# Patient Record
Sex: MALE | Race: BLACK OR AFRICAN AMERICAN | Employment: FULL TIME | URBAN - METROPOLITAN AREA
[De-identification: names, ages, dates, MRNs, and addresses within clinical notes are randomized per-mention and may not be internally consistent; named-entity substitution may affect disease eponyms.]

---

## 2017-04-26 ENCOUNTER — APPOINTMENT (OUTPATIENT)
Dept: CT IMAGING | Age: 52
End: 2017-04-26
Attending: PHYSICIAN ASSISTANT
Payer: SELF-PAY

## 2017-04-26 ENCOUNTER — HOSPITAL ENCOUNTER (EMERGENCY)
Age: 52
Discharge: HOME | End: 2017-04-26
Attending: EMERGENCY MEDICINE
Payer: SELF-PAY

## 2017-04-26 VITALS
HEIGHT: 68 IN | TEMPERATURE: 98.7 F | OXYGEN SATURATION: 96 % | SYSTOLIC BLOOD PRESSURE: 150 MMHG | BODY MASS INDEX: 30.01 KG/M2 | HEART RATE: 68 BPM | DIASTOLIC BLOOD PRESSURE: 98 MMHG | WEIGHT: 198 LBS | RESPIRATION RATE: 20 BRPM

## 2017-04-26 DIAGNOSIS — R10.32 ABDOMINAL PAIN, LLQ (LEFT LOWER QUADRANT): ICD-10-CM

## 2017-04-26 DIAGNOSIS — N30.00 ACUTE CYSTITIS WITHOUT HEMATURIA: ICD-10-CM

## 2017-04-26 DIAGNOSIS — K57.21 DIVERTICULITIS OF LARGE INTESTINE WITH PERFORATION WITH BLEEDING: Primary | ICD-10-CM

## 2017-04-26 LAB
ALBUMIN SERPL BCP-MCNC: 4 G/DL (ref 3.4–5)
ALBUMIN/GLOB SERPL: 1.1 {RATIO} (ref 0.8–1.7)
ALP SERPL-CCNC: 59 U/L (ref 45–117)
ALT SERPL-CCNC: 31 U/L (ref 16–61)
ANION GAP BLD CALC-SCNC: 7 MMOL/L (ref 3–18)
APPEARANCE UR: CLEAR
AST SERPL W P-5'-P-CCNC: 13 U/L (ref 15–37)
BACTERIA URNS QL MICRO: ABNORMAL /HPF
BASOPHILS # BLD AUTO: 0 K/UL (ref 0–0.06)
BASOPHILS # BLD: 0 % (ref 0–2)
BILIRUB DIRECT SERPL-MCNC: 0.1 MG/DL (ref 0–0.2)
BILIRUB SERPL-MCNC: 0.6 MG/DL (ref 0.2–1)
BILIRUB UR QL: NEGATIVE
BUN SERPL-MCNC: 15 MG/DL (ref 7–18)
BUN/CREAT SERPL: 16 (ref 12–20)
CALCIUM SERPL-MCNC: 9.3 MG/DL (ref 8.5–10.1)
CHLORIDE SERPL-SCNC: 105 MMOL/L (ref 100–108)
CO2 SERPL-SCNC: 27 MMOL/L (ref 21–32)
COLOR UR: YELLOW
CREAT SERPL-MCNC: 0.95 MG/DL (ref 0.6–1.3)
DIFFERENTIAL METHOD BLD: ABNORMAL
EOSINOPHIL # BLD: 0.1 K/UL (ref 0–0.4)
EOSINOPHIL NFR BLD: 1 % (ref 0–5)
EPITH CASTS URNS QL MICRO: ABNORMAL /LPF (ref 0–5)
ERYTHROCYTE [DISTWIDTH] IN BLOOD BY AUTOMATED COUNT: 14.1 % (ref 11.6–14.5)
GLOBULIN SER CALC-MCNC: 3.8 G/DL (ref 2–4)
GLUCOSE SERPL-MCNC: 94 MG/DL (ref 74–99)
GLUCOSE UR STRIP.AUTO-MCNC: NEGATIVE MG/DL
HCT VFR BLD AUTO: 43.7 % (ref 36–48)
HGB BLD-MCNC: 14.7 G/DL (ref 13–16)
HGB UR QL STRIP: NEGATIVE
KETONES UR QL STRIP.AUTO: ABNORMAL MG/DL
LEUKOCYTE ESTERASE UR QL STRIP.AUTO: ABNORMAL
LIPASE SERPL-CCNC: 237 U/L (ref 73–393)
LYMPHOCYTES # BLD AUTO: 16 % (ref 21–52)
LYMPHOCYTES # BLD: 1.5 K/UL (ref 0.9–3.6)
MCH RBC QN AUTO: 27.9 PG (ref 24–34)
MCHC RBC AUTO-ENTMCNC: 33.6 G/DL (ref 31–37)
MCV RBC AUTO: 82.9 FL (ref 74–97)
MONOCYTES # BLD: 0.5 K/UL (ref 0.05–1.2)
MONOCYTES NFR BLD AUTO: 5 % (ref 3–10)
MUCOUS THREADS URNS QL MICRO: ABNORMAL /LPF
NEUTS SEG # BLD: 7.4 K/UL (ref 1.8–8)
NEUTS SEG NFR BLD AUTO: 78 % (ref 40–73)
NITRITE UR QL STRIP.AUTO: NEGATIVE
PH UR STRIP: 6.5 [PH] (ref 5–8)
PLATELET # BLD AUTO: 276 K/UL (ref 135–420)
PMV BLD AUTO: 9.2 FL (ref 9.2–11.8)
POTASSIUM SERPL-SCNC: 3.7 MMOL/L (ref 3.5–5.5)
PROT SERPL-MCNC: 7.8 G/DL (ref 6.4–8.2)
PROT UR STRIP-MCNC: NEGATIVE MG/DL
RBC # BLD AUTO: 5.27 M/UL (ref 4.7–5.5)
RBC #/AREA URNS HPF: 0 /HPF (ref 0–5)
SODIUM SERPL-SCNC: 139 MMOL/L (ref 136–145)
SP GR UR REFRACTOMETRY: >1.03 (ref 1–1.03)
UROBILINOGEN UR QL STRIP.AUTO: 1 EU/DL (ref 0.2–1)
WBC # BLD AUTO: 9.5 K/UL (ref 4.6–13.2)
WBC URNS QL MICRO: ABNORMAL /HPF (ref 0–4)

## 2017-04-26 PROCEDURE — 99285 EMERGENCY DEPT VISIT HI MDM: CPT

## 2017-04-26 PROCEDURE — 99284 EMERGENCY DEPT VISIT MOD MDM: CPT

## 2017-04-26 PROCEDURE — 96374 THER/PROPH/DIAG INJ IV PUSH: CPT

## 2017-04-26 PROCEDURE — 74011636320 HC RX REV CODE- 636/320: Performed by: EMERGENCY MEDICINE

## 2017-04-26 PROCEDURE — 80048 BASIC METABOLIC PNL TOTAL CA: CPT | Performed by: PHYSICIAN ASSISTANT

## 2017-04-26 PROCEDURE — 74011250636 HC RX REV CODE- 250/636: Performed by: PHYSICIAN ASSISTANT

## 2017-04-26 PROCEDURE — 80076 HEPATIC FUNCTION PANEL: CPT | Performed by: PHYSICIAN ASSISTANT

## 2017-04-26 PROCEDURE — 81001 URINALYSIS AUTO W/SCOPE: CPT | Performed by: PHYSICIAN ASSISTANT

## 2017-04-26 PROCEDURE — 74177 CT ABD & PELVIS W/CONTRAST: CPT

## 2017-04-26 PROCEDURE — 85025 COMPLETE CBC W/AUTO DIFF WBC: CPT | Performed by: PHYSICIAN ASSISTANT

## 2017-04-26 PROCEDURE — 83690 ASSAY OF LIPASE: CPT | Performed by: PHYSICIAN ASSISTANT

## 2017-04-26 RX ORDER — KETOROLAC TROMETHAMINE 30 MG/ML
30 INJECTION, SOLUTION INTRAMUSCULAR; INTRAVENOUS
Status: COMPLETED | OUTPATIENT
Start: 2017-04-26 | End: 2017-04-26

## 2017-04-26 RX ORDER — CIPROFLOXACIN 500 MG/1
500 TABLET ORAL 2 TIMES DAILY
Qty: 20 TAB | Refills: 0 | Status: SHIPPED | OUTPATIENT
Start: 2017-04-26 | End: 2017-04-26

## 2017-04-26 RX ORDER — METRONIDAZOLE 500 MG/1
500 TABLET ORAL 2 TIMES DAILY
Qty: 10 TAB | Refills: 0 | Status: SHIPPED | OUTPATIENT
Start: 2017-04-26 | End: 2017-04-26

## 2017-04-26 RX ORDER — METRONIDAZOLE 500 MG/1
500 TABLET ORAL 2 TIMES DAILY
Qty: 10 TAB | Refills: 0 | Status: SHIPPED | OUTPATIENT
Start: 2017-04-26 | End: 2017-05-03

## 2017-04-26 RX ORDER — CIPROFLOXACIN 500 MG/1
500 TABLET ORAL 2 TIMES DAILY
Qty: 20 TAB | Refills: 0 | Status: SHIPPED | OUTPATIENT
Start: 2017-04-26 | End: 2017-05-06

## 2017-04-26 RX ORDER — HYDROCODONE BITARTRATE AND ACETAMINOPHEN 5; 325 MG/1; MG/1
1 TABLET ORAL
Qty: 20 TAB | Refills: 0 | Status: SHIPPED | OUTPATIENT
Start: 2017-04-26 | End: 2017-12-27

## 2017-04-26 RX ADMIN — IOPAMIDOL 100 ML: 612 INJECTION, SOLUTION INTRAVENOUS at 20:18

## 2017-04-26 RX ADMIN — KETOROLAC TROMETHAMINE 30 MG: 30 INJECTION, SOLUTION INTRAMUSCULAR at 18:41

## 2017-04-26 NOTE — ED PROVIDER NOTES
HPI Comments: 54yo M with h/o HTN and hypercholesterolemia c/o abdominal pain since last night at midnight. Pain is LLQ. Does not radiate. Worse with movement. Not alleviated by pepto or ginger ale. Pain has been constant all night. Rated 7/10. Confirms one episode of diarrhea today, no blood in stool. No nausea or vomiting. Confirms dysuria and frequency. No other complaints. No history of abdominal surgeries or diverticulitis. History of heartburn, takes zantac, none recently. Patient is a 46 y.o. male presenting with abdominal pain. Abdominal Pain    Associated symptoms include diarrhea, dysuria and frequency. Pertinent negatives include no fever, no nausea, no vomiting, no constipation and no chest pain. Past Medical History:   Diagnosis Date    Advance directive discussed with patient 10/6/2016    High cholesterol     Hypertension     Other ill-defined conditions     cardiac monitoring- history of syncope    Pure hypercholesterolemia 12/1/2011    Syncope        Past Surgical History:   Procedure Laterality Date    HX OTHER SURGICAL      cardiac monitoring device implant         Family History:   Problem Relation Age of Onset    Hypertension Mother     Heart Disease Mother     Cancer Mother     Cancer Father        Social History     Social History    Marital status:      Spouse name: N/A    Number of children: N/A    Years of education: N/A     Occupational History    Not on file. Social History Main Topics    Smoking status: Never Smoker    Smokeless tobacco: Never Used    Alcohol use No    Drug use: No    Sexual activity: Yes     Other Topics Concern    Not on file     Social History Narrative         ALLERGIES: Review of patient's allergies indicates no known allergies. Review of Systems   Constitutional: Negative for fever. HENT: Negative for facial swelling. Eyes: Negative for visual disturbance.    Respiratory: Negative for shortness of breath. Cardiovascular: Negative for chest pain. Gastrointestinal: Positive for abdominal pain and diarrhea. Negative for blood in stool, constipation, nausea and vomiting. Genitourinary: Positive for dysuria and frequency. Musculoskeletal: Negative for neck pain. Skin: Negative for rash. Neurological: Negative for dizziness. Psychiatric/Behavioral: Negative for confusion. All other systems reviewed and are negative. Vitals:    04/26/17 1812 04/26/17 1821 04/26/17 1823   BP: (!) 154/109 (!) 165/112    Pulse: 73  72   Resp: 18  18   Temp: 99.1 °F (37.3 °C)     SpO2: 100%  95%   Weight: 89.8 kg (198 lb)     Height: 5' 8\" (1.727 m)              Physical Exam   Constitutional: He appears well-developed and well-nourished. No distress. HENT:   Head: Normocephalic and atraumatic. Eyes: Conjunctivae are normal.   Neck: Normal range of motion. Neck supple. Cardiovascular: Normal rate. Pulmonary/Chest: Effort normal.   Abdominal: Soft. Bowel sounds are normal. He exhibits no distension. There is tenderness in the left upper quadrant and left lower quadrant. There is guarding. Musculoskeletal: Normal range of motion. Neurological: He is alert. Skin: Skin is warm and dry. He is not diaphoretic. Psychiatric: He has a normal mood and affect. Nursing note and vitals reviewed. MDM  Number of Diagnoses or Management Options  Abdominal pain, LLQ (left lower quadrant): new and requires workup  Acute cystitis without hematuria: new and requires workup  Diverticulitis of large intestine with perforation with bleeding [facility]): new and requires workup  Diagnosis management comments: C/o abd pain LLQ a/w diarrhea. TTP LLQ and LUQ. Afebrile. BP elevated. The patient presents with abdominal pain with a differential diagnosis of diverticulitis, gastritis, gastroenteritis, GERD, obstruction, pancreatitis, PUD, renal Colic and UTI.       2048: CT results + acute diverticulitis with extraluminal air. No abscess, can be managed outpatient. Started on abx. Has appt with PCP next week. Also has small renal cyst that will need f/u. Pain is currently controlled. Also has UTI. Discussed plan with Dr. Johnna Henderson who is in agreement. Discussed treatment plan, return precautions, symptomatic relief, and expected time to improvement. All questions answered. Patient is stable for discharge and outpatient management. Amount and/or Complexity of Data Reviewed  Clinical lab tests: ordered and reviewed  Tests in the radiology section of CPT®: ordered and reviewed    Risk of Complications, Morbidity, and/or Mortality  Presenting problems: moderate  Diagnostic procedures: moderate  Management options: moderate      ED Course       Procedures    Diagnosis:   1. Diverticulitis of large intestine with perforation with bleeding (HCC)    2. Abdominal pain, LLQ (left lower quadrant)    3. Acute cystitis without hematuria          Disposition: home    Follow-up Information     Follow up With Details Comments Catherine Kim., DO Schedule an appointment as soon as possible for a visit in 1 week  [address] [address]  [phone]      [facility] EMERGENCY DEPT  Immediately if symptoms worsen [address]  [phone]          Patient's Medications   Start Taking    CIPROFLOXACIN HCL (CIPRO) 500 MG TABLET    Take 1 Tab by mouth two (2) times a day for 10 days. HYDROCODONE-ACETAMINOPHEN (NORCO) 5-325 MG PER TABLET    Take 1 Tab by mouth every four (4) hours as needed for Pain. Max Daily Amount: 6 Tabs. METRONIDAZOLE (FLAGYL) 500 MG TABLET    Take 1 Tab by mouth two (2) times a day for 7 days. Continue Taking    AMLODIPINE (NORVASC) 10 MG TABLET    Take 1 Tab by mouth daily. Take 1 Tab by Mouth Once a Day. ASPIRIN 81 MG CHEWABLE TABLET    Take 1 Tab by mouth daily. Take 81 mg by Mouth Once a Day. ATORVASTATIN (LIPITOR) 40 MG TABLET    Take 1 Tab by mouth daily. Take 1 Tab by Mouth Every Night at Bedtime. LISINOPRIL (PRINIVIL, ZESTRIL) 20 MG TABLET    Take 1 Tab by mouth daily. NAPROXEN (NAPROSYN) 375 MG TABLET    Take 1 Tab by mouth two (2) times daily (with meals). These Medications have changed    No medications on file   Stop Taking    HYDROCODONE-ACETAMINOPHEN (NORCO) 5-325 MG PER TABLET    Take 1 Tab by mouth every eight (8) hours as needed for Pain.      Eliazar Calderon PA-C

## 2017-04-27 NOTE — DISCHARGE INSTRUCTIONS
Take suggested medications for control of abdominal pain. Increase fluid intake (water). Return to ER immediately if you develop new or worsening symptoms including vomiting, high fevers, worsening pain. Urinary Tract Infections in Men: Care Instructions  Your Care Instructions    A urinary tract infection, or UTI, is a general term for an infection anywhere between the kidneys and the tip of the penis. UTIs can also be a result of a prostate problem. Most cause pain or burning when you urinate. Most UTIs are caused by bacteria and can be cured with antibiotics. It is important to complete your treatment so that the infection does not get worse. Follow-up care is a key part of your treatment and safety. Be sure to make and go to all appointments, and call your doctor if you are having problems. It's also a good idea to know your test results and keep a list of the medicines you take. How can you care for yourself at home? · Take your antibiotics as prescribed. Do not stop taking them just because you feel better. You need to take the full course of antibiotics. · Take your medicines exactly as prescribed. Your doctor may have prescribed a medicine, such as phenazopyridine (Pyridium), to help relieve pain when you urinate. This turns your urine orange. You may stop taking it when your symptoms get better. But be sure to take all of your antibiotics, which treat the infection. · Drink extra water for the next day or two. This will help make the urine less concentrated and help wash out the bacteria causing the infection. (If you have kidney, heart, or liver disease and have to limit your fluids, talk with your doctor before you increase your fluid intake.)  · Avoid drinks that are carbonated or have caffeine. They can irritate the bladder. · Urinate often. Try to empty your bladder each time. · To relieve pain, take a hot bath or lay a heating pad (set on low) over your lower belly or genital area. Never go to sleep with a heating pad in place. To help prevent UTIs  · Drink plenty of fluids, enough so that your urine is light yellow or clear like water. If you have kidney, heart, or liver disease and have to limit fluids, talk with your doctor before you increase the amount of fluids you drink. · Urinate when you have the urge. Do not hold your urine for a long time. Urinate before you go to sleep. · Keep your penis clean. Catheter care  If you have a drainage tube (catheter) in place, the following steps will help you care for it. · Always wash your hands before and after touching your catheter. · Check the area around the urethra for inflammation or signs of infection. Signs of infection include irritated, swollen, red, or tender skin, or pus around the catheter. · Clean the area around the catheter with soap and water two times a day. Dry with a clean towel afterward. · Do not apply powder or lotion to the skin around the catheter. To empty the urine collection bag  · Wash your hands with soap and water. · Without touching the drain spout, remove the spout from its sleeve at the bottom of the collection bag. Open the valve on the spout. · Let the urine flow out of the bag and into the toilet or a container. Do not let the tubing or drain spout touch anything. · After you empty the bag, clean the end of the drain spout with tissue and water. Close the valve and put the drain spout back into its sleeve at the bottom of the collection bag. · Wash your hands with soap and water. When should you call for help? Call your doctor now or seek immediate medical care if:  · Symptoms such as a fever, chills, nausea, or vomiting get worse or happen for the first time. · You have new pain in your back just below your rib cage. This is called flank pain. · There is new blood or pus in your urine. · You are not able to take or keep down your antibiotics.   Watch closely for changes in your health, and be sure to contact your doctor if:  · You are not getting better after taking an antibiotic for 2 days. · Your symptoms go away but then come back. Where can you learn more? Go to http://ad-jeremías.info/. Enter B712 in the search box to learn more about \"Urinary Tract Infections in Men: Care Instructions. \"  Current as of: November 28, 2016  Content Version: 11.2  © 8878-3723 [employer]. Care instructions adapted under license by [employer] (which disclaims liability or warranty for this information). If you have questions about a medical condition or this instruction, always ask your healthcare professional. [address] [address] any warranty or liability for your use of this information. Diverticulitis: Care Instructions  Your Care Instructions    Diverticulitis occurs when pouches form in the wall of the colon and become inflamed or infected. It can be very painful. Doctors aren't sure what causes diverticulitis. There is no proof that foods such as nuts, seeds, or berries cause it or make it worse. A low-fiber diet may cause the colon to work harder to push stool forward. Pouches may form because of this extra work. It may be hard to think about healthy eating while you're in pain. But as you recover, you might think about how you can use healthy eating for overall better health. Healthy eating may help you avoid future attacks. Follow-up care is a key part of your treatment and safety. Be sure to make and go to all appointments, and call your doctor if you are having problems. It's also a good idea to know your test results and keep a list of the medicines you take. How can you care for yourself at home? · Drink plenty of fluids, enough so that your urine is light yellow or clear like water.  If you have kidney, heart, or liver disease and have to limit fluids, talk with your doctor before you increase the amount of fluids you drink.  · Stick to liquids or a bland diet (plain rice, bananas, dry toast or crackers, applesauce) until you are feeling better. Then you can return to regular foods and gradually increase the amount of fiber in your diet. · Use a heating pad set on low on your belly to relieve mild cramps and pain. · Get extra rest until you are feeling better. · Be safe with medicines. Read and follow all instructions on the label. ¨ If the doctor gave you a prescription medicine for pain, take it as prescribed. ¨ If you are not taking a prescription pain medicine, ask your doctor if you can take an over-the-counter medicine. · If your doctor prescribed antibiotics, take them as directed. Do not stop taking them just because you feel better. You need to take the full course of antibiotics. To prevent future attacks of diverticulitis  · Avoid constipation:  ¨ Include fruits, vegetables, beans, and whole grains in your diet each day. These foods are high in fiber. ¨ Drink plenty of fluids, enough so that your urine is light yellow or clear like water. If you have kidney, heart, or liver disease and have to limit fluids, talk with your doctor before you increase the amount of fluids you drink. ¨ Get some exercise every day. Build up slowly to 30 to 60 minutes a day on 5 or more days of the week. ¨ Take a fiber supplement, such as Citrucel or Metamucil, every day if needed. Read and follow all instructions on the label. ¨ Schedule time each day for a bowel movement. Having a daily routine may help. Take your time and do not strain when having a bowel movement. When should you call for help? Call 911 anytime you think you may need emergency care. For example, call if:  · You passed out (lost consciousness). · You vomit blood or what looks like coffee grounds. · You pass maroon or very bloody stools. Call your doctor now or seek immediate medical care if:  · You have severe pain or swelling in your belly.   · You have a new or higher fever. · You cannot keep down fluids or medicines. · You have new pain that gets worse when you move or cough. Watch closely for changes in your health, and be sure to contact your doctor if:  · The symptoms you had when you first started feeling sick come back. · You do not get better as expected. Where can you learn more? Go to http://ad-jeremías.info/. Enter H901 in the search box to learn more about \"Diverticulitis: Care Instructions. \"  Current as of: August 9, 2016  Content Version: 11.2  © 7095-8684 [employer]. Care instructions adapted under license by [employer] (which disclaims liability or warranty for this information). If you have questions about a medical condition or this instruction, always ask your healthcare professional. [address] any warranty or liability for your use of this information.

## 2017-04-27 NOTE — ED NOTES
Received call from pharmacy regarding flagyl rx. BID x 7 days with order to dispense only 10. Asked pharmacist to change rx to TID x 7 days as work-up revealed diverticulitis and dosing of flagyl is recommended TID.

## 2017-12-21 ENCOUNTER — DOCUMENTATION ONLY (OUTPATIENT)
Dept: FAMILY MEDICINE CLINIC | Age: 52
End: 2017-12-21

## 2017-12-27 ENCOUNTER — APPOINTMENT (OUTPATIENT)
Dept: CT IMAGING | Age: 52
End: 2017-12-27
Attending: EMERGENCY MEDICINE
Payer: SELF-PAY

## 2017-12-27 ENCOUNTER — HOSPITAL ENCOUNTER (EMERGENCY)
Age: 52
Discharge: HOME | End: 2017-12-27
Attending: EMERGENCY MEDICINE
Payer: SELF-PAY

## 2017-12-27 ENCOUNTER — APPOINTMENT (OUTPATIENT)
Dept: GENERAL RADIOLOGY | Age: 52
End: 2017-12-27
Attending: EMERGENCY MEDICINE
Payer: SELF-PAY

## 2017-12-27 VITALS
DIASTOLIC BLOOD PRESSURE: 89 MMHG | BODY MASS INDEX: 29.86 KG/M2 | RESPIRATION RATE: 22 BRPM | WEIGHT: 197 LBS | SYSTOLIC BLOOD PRESSURE: 146 MMHG | HEART RATE: 74 BPM | HEIGHT: 68 IN | OXYGEN SATURATION: 95 % | TEMPERATURE: 98.9 F

## 2017-12-27 DIAGNOSIS — I10 HYPERTENSION, ACCELERATED: ICD-10-CM

## 2017-12-27 DIAGNOSIS — R50.9 FEVER IN ADULT: Primary | ICD-10-CM

## 2017-12-27 DIAGNOSIS — R05.9 COUGH: ICD-10-CM

## 2017-12-27 DIAGNOSIS — I10 ESSENTIAL HYPERTENSION, MALIGNANT: ICD-10-CM

## 2017-12-27 DIAGNOSIS — E78.00 PURE HYPERCHOLESTEROLEMIA: ICD-10-CM

## 2017-12-27 DIAGNOSIS — R52 BODY ACHES: ICD-10-CM

## 2017-12-27 LAB
ANION GAP SERPL CALC-SCNC: 10 MMOL/L (ref 3–18)
BASOPHILS # BLD: 0 K/UL (ref 0–0.06)
BASOPHILS NFR BLD: 1 % (ref 0–2)
BUN SERPL-MCNC: 14 MG/DL (ref 7–18)
BUN/CREAT SERPL: 13 (ref 12–20)
CALCIUM SERPL-MCNC: 8.6 MG/DL (ref 8.5–10.1)
CHLORIDE SERPL-SCNC: 105 MMOL/L (ref 100–108)
CO2 SERPL-SCNC: 25 MMOL/L (ref 21–32)
CREAT SERPL-MCNC: 1.1 MG/DL (ref 0.6–1.3)
DIFFERENTIAL METHOD BLD: ABNORMAL
EOSINOPHIL # BLD: 0 K/UL (ref 0–0.4)
EOSINOPHIL NFR BLD: 1 % (ref 0–5)
ERYTHROCYTE [DISTWIDTH] IN BLOOD BY AUTOMATED COUNT: 14.3 % (ref 11.6–14.5)
FLUAV AG NPH QL IA: NEGATIVE
FLUBV AG NOSE QL IA: NEGATIVE
GLUCOSE SERPL-MCNC: 98 MG/DL (ref 74–99)
HCT VFR BLD AUTO: 39.8 % (ref 36–48)
HGB BLD-MCNC: 13.3 G/DL (ref 13–16)
LYMPHOCYTES # BLD: 0.5 K/UL (ref 0.9–3.6)
LYMPHOCYTES NFR BLD: 10 % (ref 21–52)
MCH RBC QN AUTO: 27.5 PG (ref 24–34)
MCHC RBC AUTO-ENTMCNC: 33.4 G/DL (ref 31–37)
MCV RBC AUTO: 82.2 FL (ref 74–97)
MONOCYTES # BLD: 0.5 K/UL (ref 0.05–1.2)
MONOCYTES NFR BLD: 11 % (ref 3–10)
NEUTS SEG # BLD: 3.9 K/UL (ref 1.8–8)
NEUTS SEG NFR BLD: 77 % (ref 40–73)
PLATELET # BLD AUTO: 274 K/UL (ref 135–420)
PMV BLD AUTO: 9.8 FL (ref 9.2–11.8)
POTASSIUM SERPL-SCNC: 3.4 MMOL/L (ref 3.5–5.5)
RBC # BLD AUTO: 4.84 M/UL (ref 4.7–5.5)
SODIUM SERPL-SCNC: 140 MMOL/L (ref 136–145)
TROPONIN I BLD-MCNC: <0.04 NG/ML (ref 0–0.08)
WBC # BLD AUTO: 5.1 K/UL (ref 4.6–13.2)

## 2017-12-27 PROCEDURE — 93005 ELECTROCARDIOGRAM TRACING: CPT

## 2017-12-27 PROCEDURE — 99285 EMERGENCY DEPT VISIT HI MDM: CPT

## 2017-12-27 PROCEDURE — 87804 INFLUENZA ASSAY W/OPTIC: CPT | Performed by: EMERGENCY MEDICINE

## 2017-12-27 PROCEDURE — 74011250637 HC RX REV CODE- 250/637: Performed by: EMERGENCY MEDICINE

## 2017-12-27 PROCEDURE — 85025 COMPLETE CBC W/AUTO DIFF WBC: CPT | Performed by: EMERGENCY MEDICINE

## 2017-12-27 PROCEDURE — 84484 ASSAY OF TROPONIN QUANT: CPT

## 2017-12-27 PROCEDURE — 70450 CT HEAD/BRAIN W/O DYE: CPT

## 2017-12-27 PROCEDURE — 71020 XR CHEST PA LAT: CPT

## 2017-12-27 PROCEDURE — 80048 BASIC METABOLIC PNL TOTAL CA: CPT | Performed by: EMERGENCY MEDICINE

## 2017-12-27 RX ORDER — AMLODIPINE BESYLATE 10 MG/1
10 TABLET ORAL DAILY
Qty: 90 TAB | Refills: 4 | Status: SHIPPED | OUTPATIENT
Start: 2017-12-27 | End: 2019-02-11 | Stop reason: SDUPTHER

## 2017-12-27 RX ORDER — AMLODIPINE BESYLATE 5 MG/1
10 TABLET ORAL
Status: COMPLETED | OUTPATIENT
Start: 2017-12-27 | End: 2017-12-27

## 2017-12-27 RX ORDER — ATORVASTATIN CALCIUM 40 MG/1
40 TABLET, FILM COATED ORAL DAILY
Qty: 90 TAB | Refills: 4 | Status: SHIPPED | OUTPATIENT
Start: 2017-12-27 | End: 2019-02-11 | Stop reason: SDUPTHER

## 2017-12-27 RX ORDER — GUAIFENESIN 100 MG/5ML
81 LIQUID (ML) ORAL DAILY
Qty: 90 TAB | Refills: 4 | Status: SHIPPED | OUTPATIENT
Start: 2017-12-27 | End: 2019-02-11 | Stop reason: SDUPTHER

## 2017-12-27 RX ORDER — NAPROXEN 375 MG/1
375 TABLET ORAL 2 TIMES DAILY WITH MEALS
Qty: 30 TAB | Refills: 1 | Status: SHIPPED | OUTPATIENT
Start: 2017-12-27 | End: 2018-07-12

## 2017-12-27 RX ORDER — ACETAMINOPHEN 500 MG
1000 TABLET ORAL
Qty: 50 TAB | Refills: 0 | Status: SHIPPED | OUTPATIENT
Start: 2017-12-27 | End: 2018-07-12

## 2017-12-27 RX ORDER — LISINOPRIL 20 MG/1
20 TABLET ORAL
Status: COMPLETED | OUTPATIENT
Start: 2017-12-27 | End: 2017-12-27

## 2017-12-27 RX ORDER — GUAIFENESIN 100 MG/5ML
162 LIQUID (ML) ORAL
Status: COMPLETED | OUTPATIENT
Start: 2017-12-27 | End: 2017-12-27

## 2017-12-27 RX ORDER — ACETAMINOPHEN 500 MG
1000 TABLET ORAL
Status: COMPLETED | OUTPATIENT
Start: 2017-12-27 | End: 2017-12-27

## 2017-12-27 RX ORDER — GUAIFENESIN/DEXTROMETHORPHAN 100-10MG/5
10 SYRUP ORAL
Status: COMPLETED | OUTPATIENT
Start: 2017-12-27 | End: 2017-12-27

## 2017-12-27 RX ORDER — CETIRIZINE HCL 10 MG
10 TABLET ORAL DAILY
Qty: 10 TAB | Refills: 0 | Status: SHIPPED | OUTPATIENT
Start: 2017-12-27 | End: 2018-07-12

## 2017-12-27 RX ORDER — IBUPROFEN 600 MG/1
600 TABLET ORAL
Status: COMPLETED | OUTPATIENT
Start: 2017-12-27 | End: 2017-12-27

## 2017-12-27 RX ORDER — LISINOPRIL 20 MG/1
20 TABLET ORAL DAILY
Qty: 90 TAB | Refills: 4 | Status: SHIPPED | OUTPATIENT
Start: 2017-12-27 | End: 2019-02-11 | Stop reason: SDUPTHER

## 2017-12-27 RX ADMIN — ACETAMINOPHEN 1000 MG: 500 TABLET ORAL at 22:02

## 2017-12-27 RX ADMIN — GUAIFENESIN AND DEXTROMETHORPHAN 10 ML: 100; 10 SYRUP ORAL at 22:16

## 2017-12-27 RX ADMIN — ASPIRIN 81 MG 162 MG: 81 TABLET ORAL at 22:16

## 2017-12-27 RX ADMIN — AMLODIPINE BESYLATE 10 MG: 5 TABLET ORAL at 22:16

## 2017-12-27 RX ADMIN — IBUPROFEN 600 MG: 600 TABLET, FILM COATED ORAL at 22:02

## 2017-12-27 RX ADMIN — LISINOPRIL 20 MG: 20 TABLET ORAL at 22:16

## 2017-12-27 NOTE — LETTER
NOTIFICATION RETURN TO WORK / SCHOOL 
 
12/27/2017 11:38 PM 
 
Mr. Bárbara Edge [address] [place] [address] To Whom It May Concern: 
 
Bárbara Edge is currently under the care of [facility] EMERGENCY DEPT. He will return to work/school on: 12/30/17 If there are questions or concerns please have the patient contact our office. Sincerely, Brendan Perdue MD

## 2017-12-28 LAB
ATRIAL RATE: 78 BPM
CALCULATED P AXIS, ECG09: 40 DEGREES
CALCULATED R AXIS, ECG10: -18 DEGREES
CALCULATED T AXIS, ECG11: 43 DEGREES
DIAGNOSIS, 93000: NORMAL
P-R INTERVAL, ECG05: 162 MS
Q-T INTERVAL, ECG07: 344 MS
QRS DURATION, ECG06: 88 MS
QTC CALCULATION (BEZET), ECG08: 392 MS
VENTRICULAR RATE, ECG03: 78 BPM

## 2017-12-28 NOTE — ED PROVIDER NOTES
HPI Comments: Jaelyn Aguirre is a 46 y.o. Male with onset of body aches, headache, subj fever, cough, congestion for last 2 days. No nvd, urinary sx, joint swelling, recent admission, travel, known exposures. No sore throat. Prod cough with sputum. Sx are worse with activity. Nothing taken at home. Also ran out of bp meds 3 days ago as well. Also c/o intermittent tingling in left hand as well. The history is provided by the patient. Past Medical History:   Diagnosis Date    Advance directive discussed with patient 10/6/2016    High cholesterol     Hypertension     Other ill-defined conditions(799.89)     cardiac monitoring- history of syncope    Pure hypercholesterolemia 12/1/2011    Syncope        Past Surgical History:   Procedure Laterality Date    HX OTHER SURGICAL      cardiac monitoring device implant         Family History:   Problem Relation Age of Onset    Hypertension Mother     Heart Disease Mother     Cancer Mother     Cancer Father        Social History     Social History    Marital status:      Spouse name: N/A    Number of children: N/A    Years of education: N/A     Occupational History    Not on file. Social History Main Topics    Smoking status: Never Smoker    Smokeless tobacco: Never Used    Alcohol use No    Drug use: No    Sexual activity: Yes     Other Topics Concern    Not on file     Social History Narrative         ALLERGIES: Review of patient's allergies indicates no known allergies. Review of Systems   Constitutional: Positive for chills, diaphoresis and fever. HENT: Positive for congestion. Negative for sore throat and trouble swallowing. Eyes: Negative for visual disturbance. Respiratory: Positive for cough. Cardiovascular: Positive for chest pain. Negative for leg swelling. Gastrointestinal: Negative for abdominal pain. Endocrine: Negative for polyuria.    Genitourinary: Negative for difficulty urinating, dysuria, frequency and hematuria. Musculoskeletal: Positive for arthralgias and myalgias. Skin: Negative for rash. Allergic/Immunologic: Negative for immunocompromised state. Neurological: Positive for headaches. Negative for syncope and speech difficulty. Psychiatric/Behavioral: Positive for sleep disturbance. Vitals:    12/27/17 2133 12/27/17 2200 12/27/17 2215 12/27/17 2311   BP: (!) 199/125 (!) 179/111 (!) 179/103    Pulse: 86 78 77    Resp: 12 20 26    Temp: (!) 101.6 °F (38.7 °C)   98.9 °F (37.2 °C)   SpO2: 98% 97% 95%    Weight: 89.4 kg (197 lb)      Height: 5' 8\" (1.727 m)               Physical Exam   Constitutional: He is oriented to person, place, and time. Non-toxic appearance. He does not have a sickly appearance. He appears ill (mild). No distress. HENT:   Head: Normocephalic and atraumatic. Right Ear: External ear normal.   Left Ear: External ear normal.   Nose: Nose normal.   Mouth/Throat: Oropharynx is clear and moist. No oropharyngeal exudate. Eyes: Conjunctivae are normal.   Neck: Normal range of motion. Cardiovascular: Normal rate, regular rhythm, normal heart sounds and intact distal pulses. Pulmonary/Chest: Effort normal and breath sounds normal. No respiratory distress. Abdominal: Soft. There is no tenderness. Musculoskeletal: Normal range of motion. He exhibits no edema. Neurological: He is alert and oriented to person, place, and time. Skin: Skin is warm and dry. He is not diaphoretic. Psychiatric: His behavior is normal.   Nursing note and vitals reviewed.        [facility]  ED Course       Procedures    Vitals:  Patient Vitals for the past 12 hrs:   Temp Pulse Resp BP SpO2   12/27/17 2311 98.9 °F (37.2 °C) - - - -   12/27/17 2215 - 77 26 (!) 179/103 95 %   12/27/17 2200 - 78 20 (!) 179/111 97 %   12/27/17 2133 (!) 101.6 °F (38.7 °C) 86 12 (!) 199/125 98 %         Medications ordered:   Medications   acetaminophen (TYLENOL) tablet 1,000 mg (1,000 mg Oral Given 12/27/17 2202)   ibuprofen (MOTRIN) tablet 600 mg (600 mg Oral Given 12/27/17 2202)   guaiFENesin-dextromethorphan (ROBITUSSIN DM) 100-10 mg/5 mL syrup 10 mL (10 mL Oral Given 12/27/17 2216)   amLODIPine (NORVASC) tablet 10 mg (10 mg Oral Given 12/27/17 2216)   lisinopril (PRINIVIL, ZESTRIL) tablet 20 mg (20 mg Oral Given 12/27/17 2216)   aspirin chewable tablet 162 mg (162 mg Oral Given 12/27/17 2216)         Lab findings:  Recent Results (from the past 12 hour(s))   CBC WITH AUTOMATED DIFF    Collection Time: 12/27/17  9:45 PM   Result Value Ref Range    WBC 5.1 4.6 - 13.2 K/uL    RBC 4.84 4.70 - 5.50 M/uL    HGB 13.3 13.0 - 16.0 g/dL    HCT 39.8 36.0 - 48.0 %    MCV 82.2 74.0 - 97.0 FL    MCH 27.5 24.0 - 34.0 PG    MCHC 33.4 31.0 - 37.0 g/dL    RDW 14.3 11.6 - 14.5 %    PLATELET 695 089 - 642 K/uL    MPV 9.8 9.2 - 11.8 FL    NEUTROPHILS 77 (H) 40 - 73 %    LYMPHOCYTES 10 (L) 21 - 52 %    MONOCYTES 11 (H) 3 - 10 %    EOSINOPHILS 1 0 - 5 %    BASOPHILS 1 0 - 2 %    ABS. NEUTROPHILS 3.9 1.8 - 8.0 K/UL    ABS. LYMPHOCYTES 0.5 (L) 0.9 - 3.6 K/UL    ABS. MONOCYTES 0.5 0.05 - 1.2 K/UL    ABS. EOSINOPHILS 0.0 0.0 - 0.4 K/UL    ABS.  BASOPHILS 0.0 0.0 - 0.06 K/UL    DF AUTOMATED     METABOLIC PANEL, BASIC    Collection Time: 12/27/17  9:45 PM   Result Value Ref Range    Sodium 140 136 - 145 mmol/L    Potassium 3.4 (L) 3.5 - 5.5 mmol/L    Chloride 105 100 - 108 mmol/L    CO2 25 21 - 32 mmol/L    Anion gap 10 3.0 - 18 mmol/L    Glucose 98 74 - 99 mg/dL    BUN 14 7.0 - 18 MG/DL    Creatinine 1.10 0.6 - 1.3 MG/DL    BUN/Creatinine ratio 13 12 - 20      GFR est AA >60 >60 ml/min/1.73m2    GFR est non-AA >60 >60 ml/min/1.73m2    Calcium 8.6 8.5 - 10.1 MG/DL   INFLUENZA A & B AG (RAPID TEST)    Collection Time: 12/27/17  9:45 PM   Result Value Ref Range    Influenza A Antigen NEGATIVE  NEG      Influenza B Antigen NEGATIVE  NEG     EKG, 12 LEAD, INITIAL    Collection Time: 12/27/17  9:51 PM   Result Value Ref Range    Ventricular Rate 78 BPM    Atrial Rate 78 BPM    P-R Interval 162 ms    QRS Duration 88 ms    Q-T Interval 344 ms    QTC Calculation (Bezet) 392 ms    Calculated P Axis 40 degrees    Calculated R Axis -18 degrees    Calculated T Axis 43 degrees    Diagnosis       Normal sinus rhythm  Voltage criteria for left ventricular hypertrophy  Nonspecific T wave abnormality  Abnormal ECG  When compared with ECG of 18-FEB-2014 09:38,  Vent. rate has increased BY  26 BPM  T wave inversion no longer evident in Inferior leads  Nonspecific T wave abnormality, worse in Lateral leads     POC TROPONIN-I    Collection Time: 12/27/17 10:05 PM   Result Value Ref Range    Troponin-I (POC) <0.04 0.00 - 0.08 ng/mL       EKG interpretation by ED Physician:  nsr with ns tw abnl. No acute ischemic changes  Rate 78, qtc 392  Rate has increased from previous but no sig ischemic changes    X-Ray, CT or other radiology findings or impressions:  XR CHEST PA LAT    (Results Pending)   CT HEAD WO CONT    (Results Pending)   cxr with nap per my interp  Ct head with nap per prelim report    Progress notes, Consult notes or additional Procedure notes:   Most likely viral, influenza, despite neg test as flu very prominent in local community now  Doubt need for tamiflu, admission. Will refill meds for home  I have discussed with patient and/or family/sig other the results, interpretation of any imaging if performed, suspected diagnosis and treatment plan to include instructions regarding the diagnoses listed to which understanding was expressed with all questions answered      Reevaluation of patient:   Stable for dc    Disposition:  Diagnosis:   1. Fever in adult    2. Body aches    3. Cough    4. Hypertension, accelerated    5. Essential hypertension, malignant    6.  Pure hypercholesterolemia        Disposition: home      Follow-up Information     Follow up With Details Comments Catherine Kim., DO Schedule an appointment as soon as possible for a visit within next week for follow up [address] [facility]   [address] [facility] EMERGENCY DEPT  If symptoms worsen [address] [address]  [phone]            Patient's Medications   Start Taking    ACETAMINOPHEN (TYLENOL EXTRA STRENGTH) 500 MG TABLET    Take 2 Tabs by mouth every six (6) hours as needed for Pain. CETIRIZINE (ZYRTEC) 10 MG TABLET    Take 1 Tab by mouth daily. GUAIFENESIN-DEXTROMETHORPHAN SR (MUCINEX DM) 600-30 MG PER TABLET    Take 1 Tab by mouth two (2) times a day. Continue Taking    No medications on file   These Medications have changed    Modified Medication Previous Medication    AMLODIPINE (NORVASC) 10 MG TABLET amLODIPine (NORVASC) 10 mg tablet       Take 1 Tab by mouth daily. Take 1 Tab by Mouth Once a Day. Take 1 Tab by mouth daily. Take 1 Tab by Mouth Once a Day. ASPIRIN 81 MG CHEWABLE TABLET aspirin 81 mg chewable tablet       Take 1 Tab by mouth daily. Take 81 mg by Mouth Once a Day. Take 1 Tab by mouth daily. Take 81 mg by Mouth Once a Day. ATORVASTATIN (LIPITOR) 40 MG TABLET atorvastatin (LIPITOR) 40 mg tablet       Take 1 Tab by mouth daily. Take 1 Tab by Mouth Every Night at Bedtime. Take 1 Tab by mouth daily. Take 1 Tab by Mouth Every Night at Bedtime. LISINOPRIL (PRINIVIL, ZESTRIL) 20 MG TABLET lisinopril (PRINIVIL, ZESTRIL) 20 mg tablet       Take 1 Tab by mouth daily. Take 1 Tab by mouth daily. NAPROXEN (NAPROSYN) 375 MG TABLET naproxen (NAPROSYN) 375 mg tablet       Take 1 Tab by mouth two (2) times daily (with meals). Take 1 Tab by mouth two (2) times daily (with meals). Stop Taking    HYDROCODONE-ACETAMINOPHEN (NORCO) 5-325 MG PER TABLET    Take 1 Tab by mouth every four (4) hours as needed for Pain. Max Daily Amount: 6 Tabs.      s

## 2017-12-28 NOTE — ED TRIAGE NOTES
C/o fever/chills, generalized body aches, productive cough with green sputum, headache and intermittent left hand tingling x2 days.

## 2018-07-11 ENCOUNTER — HOSPITAL ENCOUNTER (EMERGENCY)
Age: 53
Discharge: HOME | End: 2018-07-12
Attending: EMERGENCY MEDICINE
Payer: SELF-PAY

## 2018-07-11 VITALS
HEIGHT: 68 IN | WEIGHT: 198 LBS | DIASTOLIC BLOOD PRESSURE: 113 MMHG | TEMPERATURE: 97.8 F | BODY MASS INDEX: 30.01 KG/M2 | OXYGEN SATURATION: 98 % | RESPIRATION RATE: 18 BRPM | HEART RATE: 62 BPM | SYSTOLIC BLOOD PRESSURE: 172 MMHG

## 2018-07-11 DIAGNOSIS — L03.011 PARONYCHIA OF RIGHT MIDDLE FINGER: Primary | ICD-10-CM

## 2018-07-11 DIAGNOSIS — L03.011 CELLULITIS OF RIGHT MIDDLE FINGER: ICD-10-CM

## 2018-07-11 PROCEDURE — 75810000289 HC I&D ABSCESS SIMP/COMP/MULT

## 2018-07-11 PROCEDURE — 99283 EMERGENCY DEPT VISIT LOW MDM: CPT

## 2018-07-11 NOTE — LETTER
NOTIFICATION RETURN TO WORK / SCHOOL 
 
7/12/2018 12:37 AM 
 
Mr. Madisyn Young [address] [place] [address] To Whom It May Concern: 
 
Madisyn Young is currently under the care of [facility] EMERGENCY DEPT. He will return to work/school on: 7/13/18 If there are questions or concerns please have the patient contact our office. Sincerely, Margaret Bailey MD

## 2018-07-12 PROCEDURE — 74011000250 HC RX REV CODE- 250

## 2018-07-12 PROCEDURE — 74011250637 HC RX REV CODE- 250/637: Performed by: EMERGENCY MEDICINE

## 2018-07-12 PROCEDURE — 74011250636 HC RX REV CODE- 250/636: Performed by: EMERGENCY MEDICINE

## 2018-07-12 RX ORDER — HYDROCODONE BITARTRATE AND ACETAMINOPHEN 5; 325 MG/1; MG/1
1 TABLET ORAL
Status: COMPLETED | OUTPATIENT
Start: 2018-07-12 | End: 2018-07-12

## 2018-07-12 RX ORDER — NAPROXEN 375 MG/1
375 TABLET ORAL 2 TIMES DAILY WITH MEALS
Qty: 30 TAB | Refills: 1 | Status: SHIPPED | OUTPATIENT
Start: 2018-07-12 | End: 2018-07-20

## 2018-07-12 RX ORDER — ACETAMINOPHEN 500 MG
1000 TABLET ORAL
Qty: 50 TAB | Refills: 0 | Status: SHIPPED | OUTPATIENT
Start: 2018-07-12 | End: 2018-07-20

## 2018-07-12 RX ORDER — CEPHALEXIN 500 MG/1
1000 CAPSULE ORAL 2 TIMES DAILY
Qty: 28 CAP | Refills: 0 | Status: SHIPPED | OUTPATIENT
Start: 2018-07-12 | End: 2018-07-19

## 2018-07-12 RX ORDER — DEXAMETHASONE 4 MG/1
8 TABLET ORAL
Status: COMPLETED | OUTPATIENT
Start: 2018-07-12 | End: 2018-07-12

## 2018-07-12 RX ORDER — CEPHALEXIN 250 MG/1
1000 CAPSULE ORAL
Status: COMPLETED | OUTPATIENT
Start: 2018-07-12 | End: 2018-07-12

## 2018-07-12 RX ADMIN — DEXAMETHASONE 8 MG: 4 TABLET ORAL at 00:34

## 2018-07-12 RX ADMIN — HYDROCODONE BITARTRATE AND ACETAMINOPHEN 1 TABLET: 5; 325 TABLET ORAL at 00:34

## 2018-07-12 RX ADMIN — CEPHALEXIN 1000 MG: 250 CAPSULE ORAL at 00:34

## 2018-07-12 RX ADMIN — Medication 5 ML: at 00:11

## 2018-07-12 NOTE — ED PROVIDER NOTES
HPI Comments: Nadir Hampton is a 46 y.o. Male with c/o right middle finger distal swelling for last week, getting worse. Constant throbbing worse palpation, use. No h/o immunocompromised disease. Nothing taken. No prior h/o felon, paronychia The history is provided by the patient. Past Medical History:  
Diagnosis Date  Advance directive discussed with patient 10/6/2016  High cholesterol  Hypertension  Other ill-defined conditions(799.89)   
 cardiac monitoring- history of syncope  Pure hypercholesterolemia 12/1/2011  Syncope Past Surgical History:  
Procedure Laterality Date  HX OTHER SURGICAL    
 cardiac monitoring device implant Family History:  
Problem Relation Age of Onset  Hypertension Mother  Heart Disease Mother  Cancer Mother  Cancer Father Social History Social History  Marital status:  Spouse name: N/A  
 Number of children: N/A  
 Years of education: N/A Occupational History  Not on file. Social History Main Topics  Smoking status: Never Smoker  Smokeless tobacco: Never Used  Alcohol use No  
 Drug use: No  
 Sexual activity: Yes Other Topics Concern  Not on file Social History Narrative ALLERGIES: Review of patient's allergies indicates no known allergies. Review of Systems Constitutional: Negative for fever. Respiratory: Negative for shortness of breath. Cardiovascular: Negative for chest pain. Gastrointestinal: Negative for abdominal pain. Musculoskeletal: Positive for joint swelling. Skin: Positive for color change and rash. Allergic/Immunologic: Negative for immunocompromised state. Neurological: Negative for numbness. Psychiatric/Behavioral: Positive for sleep disturbance. Vitals:  
 07/11/18 2350 BP: (!) 172/113 Pulse: 62 Resp: 18 Temp: 97.8 °F (36.6 °C) SpO2: 98% Weight: 89.8 kg (198 lb) Height: 5' 8\" (1.727 m) Physical Exam  
Constitutional: He is oriented to person, place, and time. He appears well-developed and well-nourished. No distress. HENT:  
Head: Normocephalic and atraumatic. Eyes: Conjunctivae are normal.  
Cardiovascular: Normal rate, regular rhythm and normal heart sounds. Pulmonary/Chest: Effort normal and breath sounds normal.  
Musculoskeletal:  
     Hands: 
Neurological: He is alert and oriented to person, place, and time. Skin: He is not diaphoretic. Nursing note and vitals reviewed. [facility] 
 
 
ED Course Procedures Vitals: 
Patient Vitals for the past 12 hrs: 
 Temp Pulse Resp BP SpO2  
07/11/18 2350 97.8 °F (36.6 °C) 62 18 (!) 172/113 98 % Medications ordered:  
Medications  
lidocaine/EPINEPHrine/tetracaine (LET) topical solution 5 mL (5 mL Topical Given by Provider 7/12/18 0011) cephALEXin (KEFLEX) capsule 1,000 mg (1,000 mg Oral Given 7/12/18 0034) dexamethasone (DECADRON) tablet 8 mg (8 mg Oral Given 7/12/18 0034) HYDROcodone-acetaminophen (NORCO) 5-325 mg per tablet 1 Tab (1 Tab Oral Given 7/12/18 0034) Lab findings: 
No results found for this or any previous visit (from the past 12 hour(s)). EKG interpretation by ED Physician: X-Ray, CT or other radiology findings or impressions: No orders to display Progress notes, Consult notes or additional Procedure notes:  
Obtained verbal consent from pt for incision drainage. Alternative to include observation, abx discussed with risks of bleeding worsening infection. Pt agreed to proceed Topical with LET. Prep with chloraprep; stab incision with 11 blade with return of pus, blood. Dec in size. Pt tolerated well.  Less than 15 min total 
I have discussed with patient and/or family/sig other the results, interpretation of any imaging if performed, suspected diagnosis and treatment plan to include instructions regarding the diagnoses listed to which understanding was expressed with all questions answered Reevaluation of patient:  
stable Disposition: 
Diagnosis: 1. Paronychia of right middle finger 2. Cellulitis of right middle finger Disposition: home Follow-up Information Follow up With Details Comments Contact Info Dmitry Maciel MD Schedule an appointment as soon as possible for a visit If symptoms worsen [address] [facility] [address] 
[phone] Discharge Medication List as of 7/12/2018 12:37 AM  
  
START taking these medications Details  
cephALEXin (KEFLEX) 500 mg capsule Take 2 Caps by mouth two (2) times a day for 7 days. , Print, Disp-28 Cap, R-0  
  
  
CONTINUE these medications which have CHANGED Details  
acetaminophen (TYLENOL EXTRA STRENGTH) 500 mg tablet Take 2 Tabs by mouth every six (6) hours as needed for Pain., Print, Disp-50 Tab, R-0  
  
naproxen (NAPROSYN) 375 mg tablet Take 1 Tab by mouth two (2) times daily (with meals). , Print, Disp-30 Tab, R-1  
  
  
CONTINUE these medications which have NOT CHANGED Details  
lisinopril (PRINIVIL, ZESTRIL) 20 mg tablet Take 1 Tab by mouth daily. , Print, Disp-90 Tab, R-4  
  
amLODIPine (NORVASC) 10 mg tablet Take 1 Tab by mouth daily. Take 1 Tab by Mouth Once a Day. , Print, Disp-90 Tab, R-4  
  
atorvastatin (LIPITOR) 40 mg tablet Take 1 Tab by mouth daily. Take 1 Tab by Mouth Every Night at Bedtime. , Print, Disp-90 Tab, R-4  
  
aspirin 81 mg chewable tablet Take 1 Tab by mouth daily. Take 81 mg by Mouth Once a Day. , Print, Disp-90 Tab, R-4  
  
  
STOP taking these medications  
  
 cetirizine (ZYRTEC) 10 mg tablet Comments:  
Reason for Stopping:   
   
 guaiFENesin-dextromethorphan SR (MUCINEX DM) 600-30 mg per tablet Comments:  
Reason for Stopping:

## 2018-07-12 NOTE — ED TRIAGE NOTES
Pt c/o right middle finger swelling and pain X1 week.  Middle finger moderately swollen with redness to the area

## 2018-07-20 ENCOUNTER — HOSPITAL ENCOUNTER (EMERGENCY)
Age: 53
Discharge: HOME | End: 2018-07-20
Attending: EMERGENCY MEDICINE
Payer: SELF-PAY

## 2018-07-20 ENCOUNTER — APPOINTMENT (OUTPATIENT)
Dept: GENERAL RADIOLOGY | Age: 53
End: 2018-07-20
Attending: NURSE PRACTITIONER
Payer: SELF-PAY

## 2018-07-20 VITALS
HEART RATE: 54 BPM | TEMPERATURE: 98 F | RESPIRATION RATE: 18 BRPM | SYSTOLIC BLOOD PRESSURE: 181 MMHG | BODY MASS INDEX: 29.86 KG/M2 | HEIGHT: 68 IN | DIASTOLIC BLOOD PRESSURE: 125 MMHG | OXYGEN SATURATION: 98 % | WEIGHT: 197 LBS

## 2018-07-20 DIAGNOSIS — S52.021A OLECRANON FRACTURE, RIGHT, CLOSED, INITIAL ENCOUNTER: Primary | ICD-10-CM

## 2018-07-20 DIAGNOSIS — R03.0 ELEVATED BLOOD PRESSURE READING: ICD-10-CM

## 2018-07-20 DIAGNOSIS — S46.311A RUPTURED TRICEPS TENDON, RIGHT, INITIAL ENCOUNTER: ICD-10-CM

## 2018-07-20 PROCEDURE — 73060 X-RAY EXAM OF HUMERUS: CPT

## 2018-07-20 PROCEDURE — 99283 EMERGENCY DEPT VISIT LOW MDM: CPT

## 2018-07-20 PROCEDURE — 74011250637 HC RX REV CODE- 250/637: Performed by: NURSE PRACTITIONER

## 2018-07-20 PROCEDURE — 73080 X-RAY EXAM OF ELBOW: CPT

## 2018-07-20 PROCEDURE — 75810000053 HC SPLINT APPLICATION

## 2018-07-20 RX ORDER — IBUPROFEN 600 MG/1
600 TABLET ORAL
Qty: 20 TAB | Refills: 0 | Status: SHIPPED | OUTPATIENT
Start: 2018-07-20 | End: 2019-02-11

## 2018-07-20 RX ORDER — OXYCODONE AND ACETAMINOPHEN 5; 325 MG/1; MG/1
1 TABLET ORAL
Status: COMPLETED | OUTPATIENT
Start: 2018-07-20 | End: 2018-07-20

## 2018-07-20 RX ORDER — OXYCODONE AND ACETAMINOPHEN 5; 325 MG/1; MG/1
1 TABLET ORAL
Qty: 12 TAB | Refills: 0 | Status: SHIPPED | OUTPATIENT
Start: 2018-07-20 | End: 2019-02-11

## 2018-07-20 RX ADMIN — OXYCODONE HYDROCHLORIDE AND ACETAMINOPHEN 1 TABLET: 5; 325 TABLET ORAL at 20:23

## 2018-07-20 NOTE — LETTER
[address] [facility] EMERGENCY DEPT 
[address] [id number] 
[phone] Work/School Note Date: 7/20/2018 To Whom It May concern: 
 
Dunia Pritchett was seen and treated today in the emergency room by the following provider(s): 
Attending Provider: Oz Reyes MD 
Nurse Practitioner: Alfredo Hough NP. Dunia Pritchett must follow-up with orthopedist prior to return to work. Sincerely, Alfredo Hough NP

## 2018-07-21 NOTE — ED PROVIDER NOTES
HPI Comments: 8:16 PM  
46 y.o. male presents to ED C/O right elbow pain. Patient has a HX of HTN, high cholesterol. Patient was lifting dry wall over his head toda 1 hour prior to arrival, person helping him lift stopped helping, with increased weight his right arm popped down to 90 degree angle and he felt a crunch. Patient reports right elbow pain, worsening since onset, most comfortable extended. Patient denies loss of sensation but reports finger tingle intermittent on right hand. Patient is left hand dominant. Patient is a nonsmoker. Patient denies any other symptoms or complaints. The history is provided by the patient. History limited by: No language barrier Past Medical History:  
Diagnosis Date  Advance directive discussed with patient 10/6/2016  High cholesterol  Hypertension  Other ill-defined conditions(799.89)   
 cardiac monitoring- history of syncope  Pure hypercholesterolemia 12/1/2011  Syncope Past Surgical History:  
Procedure Laterality Date  HX OTHER SURGICAL    
 cardiac monitoring device implant Family History:  
Problem Relation Age of Onset  Hypertension Mother  Heart Disease Mother  Cancer Mother  Cancer Father Social History Social History  Marital status:  Spouse name: N/A  
 Number of children: N/A  
 Years of education: N/A Occupational History  Not on file. Social History Main Topics  Smoking status: Never Smoker  Smokeless tobacco: Never Used  Alcohol use No  
 Drug use: No  
 Sexual activity: Yes Other Topics Concern  Not on file Social History Narrative ALLERGIES: Review of patient's allergies indicates no known allergies. Review of Systems Constitutional: Negative for activity change, appetite change, chills, fatigue and fever. HENT: Negative for congestion, ear pain, rhinorrhea and sore throat.    
Eyes: Negative for pain, discharge, redness and itching. Respiratory: Negative for cough, chest tightness, shortness of breath and wheezing. Cardiovascular: Negative for chest pain and palpitations. Gastrointestinal: Negative for abdominal pain, blood in stool, constipation, diarrhea, nausea and vomiting. Endocrine: Negative for polyuria. Genitourinary: Negative for discharge, dysuria, flank pain, hematuria, penile pain and testicular pain. Musculoskeletal: Positive for arthralgias. Negative for back pain, joint swelling and neck pain. Skin: Negative for rash and wound. Allergic/Immunologic: Negative for immunocompromised state. Neurological: Negative for dizziness, weakness, light-headedness, numbness and headaches. Hematological: Negative for adenopathy. Psychiatric/Behavioral: Negative for agitation and confusion. The patient is not nervous/anxious. All other systems reviewed and are negative. Vitals:  
 07/20/18 2017 BP: (!) 181/125 Pulse: (!) 54 Resp: 18 Temp: 98 °F (36.7 °C) SpO2: 98% Weight: 89.4 kg (197 lb) Height: 5' 8\" (1.727 m) Physical Exam  
Constitutional: He appears well-developed and well-nourished. No distress. Patient appears uncomfortable. HENT:  
Head: Atraumatic. Mouth/Throat: Oropharynx is clear and moist.  
Cardiovascular: Normal rate, regular rhythm, normal heart sounds and intact distal pulses. Pulmonary/Chest: Effort normal and breath sounds normal. No respiratory distress. He has no wheezes. He has no rales. Musculoskeletal:  
     Right shoulder: He exhibits normal range of motion, no tenderness and no bony tenderness. Right elbow: He exhibits decreased range of motion, swelling and effusion. Tenderness found. Olecranon process tenderness noted. Right wrist: He exhibits normal range of motion, no tenderness and no bony tenderness. Right upper arm: He exhibits tenderness and bony tenderness. He exhibits no swelling and no deformity. Arms: 
Skin: He is not diaphoretic. Nursing note and vitals reviewed. MDM Number of Diagnoses or Management Options Elevated blood pressure reading:  
Olecranon fracture, right, closed, initial encounter:  
Ruptured triceps tendon, right, initial encounter:  
Diagnosis management comments: MDM: 
Plan - xray of right elbow and humerus Progress - portion of olecranon process appears avulsed CONSULT NOTE:  
10:00 PM 
I spoke with Vinay MOONEY Specialty: orthopedist 
Discussed pt's hx, disposition, and available diagnostic and imaging results. Reviewed care plans. Consulting physician agrees with plans as outlined. Splint in 45 degree long arm posterior splint, follow-up with Dr Jerson Sparks on Tuesday in office, advise patient surgery probable. SPLINT ASSESSMENT: 
Long arm posterior Splint was correctly applied to the right arm by sandra EDT. Splint is in a good position. Pulse, motor and sensation were intact before and after splint was applied. 11:12 PM patient informed of results, discharge plan, importance of orthopedist follow-up. Patient educated to call  as needed for help with follow-up. Distal pulse intact prior to discharge. Patient referred to PCP for further evaluation of elevated blood pressure. Patient did not take his BP medication today. Patient educated to return to the ED for any new or worsening symptoms. Patient denies questions. Amount and/or Complexity of Data Reviewed Tests in the radiology section of CPT®: ordered and reviewed Independent visualization of images, tracings, or specimens: yes ED Course Procedures RESULTS: 
 
XR ELBOW RT MIN 3 V    (Results Pending) XR HUMERUS RT    (Results Pending) Labs Reviewed - No data to display No results found for this or any previous visit (from the past 12 hour(s)). PROGRESS NOTE:  
8:17 PM  
Initial assessment completed.  
Written by Alfredito Peaocck NP-C 
 
DISCHARGE NOTE: 
11:13 PM  
Iveth Peoples's  results have been reviewed with him. He has been counseled regarding his diagnosis, treatment, and plan. He verbally conveys understanding and agreement of the signs, symptoms, diagnosis, treatment and prognosis and additionally agrees to follow up as discussed. He also agrees with the care-plan and conveys that all of his questions have been answered. I have also provided discharge instructions for him that include: educational information regarding their diagnosis and treatment, and list of reasons why they would want to return to the ED prior to their follow-up appointment, should his condition change. CLINICAL IMPRESSION: 
 
1. Olecranon fracture, right, closed, initial encounter 2. Ruptured triceps tendon, right, initial encounter 3. Elevated blood pressure reading AFTER VISIT PLAN: 
 
Current Discharge Medication List  
  
START taking these medications Details  
ibuprofen (MOTRIN) 600 mg tablet Take 1 Tab by mouth every six (6) hours as needed for Pain. Qty: 20 Tab, Refills: 0  
  
oxyCODONE-acetaminophen (PERCOCET) 5-325 mg per tablet Take 1 Tab by mouth every six (6) hours as needed for Pain. Max Daily Amount: 4 Tabs. Qty: 12 Tab, Refills: 0 Associated Diagnoses: Olecranon fracture, right, closed, initial encounter; Ruptured triceps tendon, right, initial encounter STOP taking these medications  
  
 cephALEXin (KEFLEX) 500 mg capsule Comments:  
Reason for Stopping:   
   
 acetaminophen (TYLENOL EXTRA STRENGTH) 500 mg tablet Comments:  
Reason for Stopping: Follow-up Information Follow up With Details Comments Contact Info Guillermina Correa,  Schedule an appointment as soon as possible for a visit in 1 week Further evaluation - elevated blood pressure  [address] [address] [address] 
[phone]  Julio Yanez MD Schedule an appointment as soon as possible for a visit in 2 days Further evaluation - need appt tuesday, they should be expecting your call [address] [facility] [address] 
[phone] Carlos Alberto Prado [address] Call Assistance for follow-up [address] [place] 
[phone] Written by Edwin ESPOSITO

## 2018-07-21 NOTE — ED TRIAGE NOTES
Patient states he holding up some drywall with his partner. His partner let his end down and patient was holding all the weight of the dry wall an hour ago. States he heard a \"crunch sound\" C/o right elbow pain.

## 2018-07-25 ENCOUNTER — OFFICE VISIT (OUTPATIENT)
Dept: FAMILY MEDICINE CLINIC | Age: 53
End: 2018-07-25

## 2018-07-25 ENCOUNTER — HOSPITAL ENCOUNTER (OUTPATIENT)
Dept: LAB | Age: 53
Discharge: HOME | End: 2018-07-25
Payer: SELF-PAY

## 2018-07-25 VITALS
WEIGHT: 201.2 LBS | SYSTOLIC BLOOD PRESSURE: 151 MMHG | OXYGEN SATURATION: 97 % | HEIGHT: 68 IN | HEART RATE: 61 BPM | BODY MASS INDEX: 30.49 KG/M2 | RESPIRATION RATE: 20 BRPM | TEMPERATURE: 98.1 F | DIASTOLIC BLOOD PRESSURE: 90 MMHG

## 2018-07-25 DIAGNOSIS — I10 ESSENTIAL HYPERTENSION, MALIGNANT: Primary | ICD-10-CM

## 2018-07-25 DIAGNOSIS — I10 ESSENTIAL HYPERTENSION, MALIGNANT: ICD-10-CM

## 2018-07-25 DIAGNOSIS — M17.10 ARTHRITIS OF KNEE: ICD-10-CM

## 2018-07-25 DIAGNOSIS — E78.00 PURE HYPERCHOLESTEROLEMIA: ICD-10-CM

## 2018-07-25 LAB
ALBUMIN SERPL-MCNC: 3.8 G/DL (ref 3.4–5)
ALBUMIN/GLOB SERPL: 1 {RATIO} (ref 0.8–1.7)
ALP SERPL-CCNC: 55 U/L (ref 45–117)
ALT SERPL-CCNC: 29 U/L (ref 16–61)
ANION GAP SERPL CALC-SCNC: 7 MMOL/L (ref 3–18)
APPEARANCE UR: CLEAR
AST SERPL-CCNC: 16 U/L (ref 15–37)
BACTERIA URNS QL MICRO: NEGATIVE /HPF
BASOPHILS # BLD: 0 K/UL (ref 0–0.1)
BASOPHILS NFR BLD: 1 % (ref 0–2)
BILIRUB SERPL-MCNC: 0.4 MG/DL (ref 0.2–1)
BILIRUB UR QL: NEGATIVE
BUN SERPL-MCNC: 13 MG/DL (ref 7–18)
BUN/CREAT SERPL: 13 (ref 12–20)
CALCIUM SERPL-MCNC: 8.7 MG/DL (ref 8.5–10.1)
CHLORIDE SERPL-SCNC: 106 MMOL/L (ref 100–108)
CHOLEST SERPL-MCNC: 236 MG/DL
CO2 SERPL-SCNC: 27 MMOL/L (ref 21–32)
COLOR UR: ABNORMAL
CREAT SERPL-MCNC: 1.03 MG/DL (ref 0.6–1.3)
DIFFERENTIAL METHOD BLD: ABNORMAL
EOSINOPHIL # BLD: 0.1 K/UL (ref 0–0.4)
EOSINOPHIL NFR BLD: 2 % (ref 0–5)
EPITH CASTS URNS QL MICRO: ABNORMAL /LPF (ref 0–5)
ERYTHROCYTE [DISTWIDTH] IN BLOOD BY AUTOMATED COUNT: 14.8 % (ref 11.6–14.5)
GLOBULIN SER CALC-MCNC: 3.7 G/DL (ref 2–4)
GLUCOSE SERPL-MCNC: 96 MG/DL (ref 74–99)
GLUCOSE UR STRIP.AUTO-MCNC: NEGATIVE MG/DL
HCT VFR BLD AUTO: 39.8 % (ref 36–48)
HDLC SERPL-MCNC: 43 MG/DL (ref 40–60)
HDLC SERPL: 5.5 {RATIO} (ref 0–5)
HGB BLD-MCNC: 13.2 G/DL (ref 13–16)
HGB UR QL STRIP: NEGATIVE
KETONES UR QL STRIP.AUTO: NEGATIVE MG/DL
LDLC SERPL CALC-MCNC: 135.2 MG/DL (ref 0–100)
LEUKOCYTE ESTERASE UR QL STRIP.AUTO: ABNORMAL
LIPID PROFILE,FLP: ABNORMAL
LYMPHOCYTES # BLD: 1.9 K/UL (ref 0.9–3.6)
LYMPHOCYTES NFR BLD: 35 % (ref 21–52)
MCH RBC QN AUTO: 27.9 PG (ref 24–34)
MCHC RBC AUTO-ENTMCNC: 33.2 G/DL (ref 31–37)
MCV RBC AUTO: 84.1 FL (ref 74–97)
MONOCYTES # BLD: 0.2 K/UL (ref 0.05–1.2)
MONOCYTES NFR BLD: 4 % (ref 3–10)
MUCOUS THREADS URNS QL MICRO: ABNORMAL /LPF
NEUTS SEG # BLD: 3.1 K/UL (ref 1.8–8)
NEUTS SEG NFR BLD: 58 % (ref 40–73)
NITRITE UR QL STRIP.AUTO: NEGATIVE
PH UR STRIP: 5.5 [PH] (ref 5–8)
PLATELET # BLD AUTO: 305 K/UL (ref 135–420)
PMV BLD AUTO: 10 FL (ref 9.2–11.8)
POTASSIUM SERPL-SCNC: 3.4 MMOL/L (ref 3.5–5.5)
PROT SERPL-MCNC: 7.5 G/DL (ref 6.4–8.2)
PROT UR STRIP-MCNC: ABNORMAL MG/DL
PSA SERPL-MCNC: 2.3 NG/ML (ref 0–4)
RBC # BLD AUTO: 4.73 M/UL (ref 4.7–5.5)
RBC #/AREA URNS HPF: 0 /HPF (ref 0–5)
SODIUM SERPL-SCNC: 140 MMOL/L (ref 136–145)
SP GR UR REFRACTOMETRY: 1.02 (ref 1–1.03)
TRIGL SERPL-MCNC: 289 MG/DL (ref ?–150)
TSH SERPL DL<=0.05 MIU/L-ACNC: 4 UIU/ML (ref 0.36–3.74)
UROBILINOGEN UR QL STRIP.AUTO: 1 EU/DL (ref 0.2–1)
VLDLC SERPL CALC-MCNC: 57.8 MG/DL
WBC # BLD AUTO: 5.3 K/UL (ref 4.6–13.2)
WBC URNS QL MICRO: ABNORMAL /HPF (ref 0–4)

## 2018-07-25 PROCEDURE — 80061 LIPID PANEL: CPT | Performed by: FAMILY MEDICINE

## 2018-07-25 PROCEDURE — 81001 URINALYSIS AUTO W/SCOPE: CPT | Performed by: FAMILY MEDICINE

## 2018-07-25 PROCEDURE — 85025 COMPLETE CBC W/AUTO DIFF WBC: CPT | Performed by: FAMILY MEDICINE

## 2018-07-25 PROCEDURE — 84153 ASSAY OF PSA TOTAL: CPT | Performed by: FAMILY MEDICINE

## 2018-07-25 PROCEDURE — 36415 COLL VENOUS BLD VENIPUNCTURE: CPT | Performed by: FAMILY MEDICINE

## 2018-07-25 PROCEDURE — 84443 ASSAY THYROID STIM HORMONE: CPT | Performed by: FAMILY MEDICINE

## 2018-07-25 PROCEDURE — 80053 COMPREHEN METABOLIC PANEL: CPT | Performed by: FAMILY MEDICINE

## 2018-07-25 NOTE — PROGRESS NOTES
Chantel Roman is a 46 y.o.  male and presents with    Chief Complaint   Patient presents with    Hypertension    Cholesterol Problem    Arthritis    Elbow Injury       Pt went to ED with right elbow fractgured  Noted to have very elevate dbp  Has been non compliant iwth f/u and meds   Last seen by me around 2 yr ago. Subjective:    Cardiovascular Review:  The patient has hypertension and hyperlipidemia. Diet and Lifestyle: not attempting to follow a low fat, low cholesterol diet, not attempting to follow a low sodium diet  Home BP Monitoring: is not measured at home. Pertinent ROS: taking medications as instructed, no medication side effects noted, no TIA's, no chest pain on exertion, no dyspnea on exertion, no swelling of ankles. Osteoarthritis and Chronic Pain:  Patient has osteoarthritis, primarily affecting the diffuse. Symptoms onset: problem is longstanding. Rheumatological ROS: no current joint or muscle symptoms, essentially pain-free. Response to treatment plan: stable. Additional Concerns:          Patient Active Problem List    Diagnosis Date Noted    Advance directive discussed with patient 10/06/2016    Arthritis of knee 06/01/2012    Essential hypertension, malignant 12/01/2011    Pure hypercholesterolemia 12/01/2011     Current Outpatient Prescriptions   Medication Sig Dispense Refill    ibuprofen (MOTRIN) 600 mg tablet Take 1 Tab by mouth every six (6) hours as needed for Pain. 20 Tab 0    oxyCODONE-acetaminophen (PERCOCET) 5-325 mg per tablet Take 1 Tab by mouth every six (6) hours as needed for Pain. Max Daily Amount: 4 Tabs. 12 Tab 0    lisinopril (PRINIVIL, ZESTRIL) 20 mg tablet Take 1 Tab by mouth daily. 90 Tab 4    amLODIPine (NORVASC) 10 mg tablet Take 1 Tab by mouth daily. Take 1 Tab by Mouth Once a Day. 90 Tab 4    atorvastatin (LIPITOR) 40 mg tablet Take 1 Tab by mouth daily. Take 1 Tab by Mouth Every Night at Bedtime.  90 Tab 4    aspirin 81 mg chewable tablet Take 1 Tab by mouth daily. Take 81 mg by Mouth Once a Day. 90 Tab 4     No Known Allergies  Past Medical History:   Diagnosis Date    Advance directive discussed with patient 10/6/2016    High cholesterol     Hypertension     Other ill-defined conditions(799.89)     cardiac monitoring- history of syncope    Pure hypercholesterolemia 12/1/2011    Syncope      Past Surgical History:   Procedure Laterality Date    HX OTHER SURGICAL      cardiac monitoring device implant     Family History   Problem Relation Age of Onset    Hypertension Mother     Heart Disease Mother     Cancer Mother     Cancer Father      Social History   Substance Use Topics    Smoking status: Never Smoker    Smokeless tobacco: Never Used    Alcohol use No       ROS       All other systems reviewed and are negative. Objective:  Vitals:    07/25/18 1406 07/25/18 1410   BP: 144/90 151/90   Pulse: 63 61   Resp: 20    Temp: 98.1 °F (36.7 °C)    TempSrc: Oral    SpO2: 97%    Weight: 201 lb 3.2 oz (91.3 kg)    Height: 5' 8\" (1.727 m)    PainSc:   0 - No pain                  alert, well appearing, and in no distress and normal appearing weight  Chest - clear to auscultation, no wheezes, rales or rhonchi, symmetric air entry  Heart - normal rate, regular rhythm, normal S1, S2, no murmurs, rubs, clicks or gallops        LABS     TESTS      Assessment/Plan:    Hypertension - patient poorly compliant  Hyperlipidemia - patient poorly compliant    Lab review: orders written for new lab studies as appropriate; see orders, no lab studies available for review at time of visit    Diagnoses and all orders for this visit:    1. Essential hypertension, malignant  -     LIPID PANEL; Future  -     CBC WITH AUTOMATED DIFF; Future  -     METABOLIC PANEL, COMPREHENSIVE; Future  -     PROSTATE SPECIFIC AG; Future  -     URINALYSIS W/ RFLX MICROSCOPIC; Future  -     TSH 3RD GENERATION; Future    2.  Pure hypercholesterolemia  - LIPID PANEL; Future  -     CBC WITH AUTOMATED DIFF; Future  -     METABOLIC PANEL, COMPREHENSIVE; Future  -     PROSTATE SPECIFIC AG; Future  -     URINALYSIS W/ RFLX MICROSCOPIC; Future  -     TSH 3RD GENERATION; Future    3. Arthritis of knee  -     LIPID PANEL; Future  -     CBC WITH AUTOMATED DIFF; Future  -     METABOLIC PANEL, COMPREHENSIVE; Future  -     PROSTATE SPECIFIC AG; Future  -     URINALYSIS W/ RFLX MICROSCOPIC; Future  -     TSH 3RD GENERATION; Future          I have discussed the diagnosis with the patient and the intended plan as seen in the above orders. The patient has received an after-visit summary and questions were answered concerning future plans. I have discussed medication side effects and warnings with the patient as well. I have reviewed the plan of care with the patient, accepted their input and they are in agreement with the treatment goals. Follow-up Disposition:  Return in about 4 weeks (around 8/22/2018) for physical, labs today, EKG next visit.

## 2018-07-25 NOTE — PROGRESS NOTES
Room #      SUBJECTIVE:    Camacho Brewer is a 46 y.o. male who presents today for ED follow up    1. Have you been to the ER, urgent care clinic since your last visit? Hospitalized since your last visit? YES    2. Have you seen or consulted any other health care providers outside of the [address] [place] [address] since your last visit? Include any pap smears or colon screening. YES  When :  Reason:    Health Maintenance reviewed Yes    Health Maintenance Due   Topic Date Due    Hepatitis C Screening  1965    FOBT Q 1 YEAR AGE 50-75  12/16/2015

## 2018-07-25 NOTE — MR AVS SNAPSHOT
Jen Hernandez 
 
 
 [address] [place] [id number] 
[phone] Patient: Karla Stain MRN: [id number] :1965 Visit Information Date & Time Provider Department Dept. Phone Encounter #  
 2018  3:00 PM Warner Hernandez [phone] [id number] Follow-up Instructions Return in about 4 weeks (around 2018) for physical, labs today, EKG next visit. Follow-up and Disposition History Your Appointments 2018  3:00 PM  
[place] [id number] [address]) Appt Note: f/u; rs  
 [address] [place] [address] [facility] [address]  
  
   
 [address] Upcoming Health Maintenance Date Due Hepatitis C Screening 1965 FOBT Q 1 YEAR AGE 50-75 2015 Influenza Age 5 to Adult 2018 DTaP/Tdap/Td series (2 - Td) 2022 Allergies as of 2018  Review Complete On: 2018 By: Danelle Harding DO No Known Allergies Current Immunizations  Reviewed on 2012 Name Date Influenza Vaccine (Quad) PF 10/6/2016, 2016 Pneumococcal Polysaccharide (PPSV-23) 2016 TDAP Vaccine 2012 Not reviewed this visit You Were Diagnosed With   
  
 Codes Comments Essential hypertension, malignant    -  Primary ICD-10-CM: I10 
ICD-9-CM: 401.0 Pure hypercholesterolemia     ICD-10-CM: E78.00 ICD-9-CM: 272.0 Arthritis of knee     ICD-10-CM: M17.10 ICD-9-CM: 716.96 Vitals BP Pulse Temp Resp Height(growth percentile) Weight(growth percentile) 151/90 (BP 1 Location: Left arm, BP Patient Position: Sitting) 61 98.1 °F (36.7 °C) (Oral) 20 5' 8\" (1.727 m) 201 lb 3.2 oz (91.3 kg) SpO2 BMI Smoking Status 97% 30.59 kg/m2 Never Smoker Vitals History BMI and BSA Data Body Mass Index Body Surface Area 30.59 kg/m 2 2.09 m 2 Preferred Pharmacy Pharmacy Name Phone [facility] [address] [address] [phone] Your Updated Medication List  
  
   
This list is accurate as of 7/25/18  2:39 PM.  Always use your most recent med list. amLODIPine 10 mg tablet Commonly known as:  Vonc[place] Take 1 Tab by mouth daily. Take 1 Tab by Mouth Once a Day. aspirin 81 mg chewable tablet Take 1 Tab by mouth daily. Take 81 mg by Mouth Once a Day. atorvastatin 40 mg tablet Commonly known as:  LIPITOR Take 1 Tab by mouth daily. Take 1 Tab by Mouth Every Night at Bedtime. ibuprofen 600 mg tablet Commonly known as:  MOTRIN Take 1 Tab by mouth every six (6) hours as needed for Pain. lisinopril 20 mg tablet Commonly known as:  [place] Take 1 Tab by mouth daily. oxyCODONE-acetaminophen 5-325 mg per tablet Commonly known as:  PERCOCET Take 1 Tab by mouth every six (6) hours as needed for Pain. Max Daily Amount: 4 Tabs. Follow-up Instructions Return in about 4 weeks (around 8/22/2018) for physical, labs today, EKG next visit. To-Do List   
 07/25/2018 Lab:  CBC WITH AUTOMATED DIFF   
  
 07/25/2018 Lab:  LIPID PANEL   
  
 07/25/2018 Lab:  METABOLIC PANEL, COMPREHENSIVE   
  
 07/25/2018 Lab:  PSA, DIAGNOSTIC (PROSTATE SPECIFIC AG)   
  
 07/25/2018 Lab:  TSH 3RD GENERATION   
  
 07/25/2018 Lab:  URINALYSIS W/ RFLX MICROSCOPIC Introducing [facility]! Chelly Batres introduces seedtag patient portal. Now you can access parts of your medical record, email your doctor's office, and request medication refills online. 1. In your internet browser, go to [email or web address] 2. Click on the First Time User? Click Here link in the Sign In box. You will see the New Member Sign Up page. 3. Enter your [employer] Access Code exactly as it appears below. You will not need to use this code after youve completed the sign-up process. If you do not sign up before the expiration date, you must request a new code. · [employer] Access Code: [email or web address] Expires: 10/9/2018 11:48 PM 
 
4. Enter the last four digits of your Social Security Number (xxxx) and Date of Birth (mm/dd/yyyy) as indicated and click Submit. You will be taken to the next sign-up page. 5. Create a Luminatet ID. This will be your Barnacle login ID and cannot be changed, so think of one that is secure and easy to remember. 6. Create a [facility]acle password. You can change your password at any time. 7. Enter your Password Reset Question and Answer. This can be used at a later time if you forget your password. 8. Enter your e-mail address. You will receive e-mail notification when new information is available in [address] 9. Click Sign Up. You can now view and download portions of your medical record. 10. Click the Download Summary menu link to download a portable copy of your medical information. If you have questions, please visit the Frequently Asked Questions section of the [employer]acle website. Remember, [facility] is NOT to be used for urgent needs. For medical emergencies, dial 911. Now available from your iPhone and Android! Please provide this summary of care documentation to your next provider. Your primary care clinician is listed as [id number] [facility]. If you have any questions after today's visit, please call [phone].

## 2018-08-31 ENCOUNTER — DOCUMENTATION ONLY (OUTPATIENT)
Dept: FAMILY MEDICINE CLINIC | Age: 53
End: 2018-08-31

## 2018-08-31 NOTE — LETTER
8/31/2018 Santiago Esteves [address] [place] [address] Dear Mr. Santiago Esteves, We had an appointment reserved for you on 8/29/18 and were concerned when you did not show or call within 24 hours to cancel the appointment. As mentioned in a previous letter to you, appointment time is limited and no-showed appointments may prevent another sick individual who needs to be seen from getting a preferred appointment time. Please note that a continued pattern of not showing for appointments may result in your inability to pre-book future appointments as well as possible discharge from the practice. Please call us at your earliest convenience to reschedule your appointment as your provider felt it was important to see you. Thank you for your anticipated cooperation. Sincerely, [address] [place] [id number] [phone]

## 2019-02-11 ENCOUNTER — OFFICE VISIT (OUTPATIENT)
Dept: FAMILY MEDICINE CLINIC | Age: 54
End: 2019-02-11

## 2019-02-11 VITALS
DIASTOLIC BLOOD PRESSURE: 98 MMHG | HEART RATE: 50 BPM | RESPIRATION RATE: 18 BRPM | WEIGHT: 201.4 LBS | OXYGEN SATURATION: 99 % | BODY MASS INDEX: 30.52 KG/M2 | HEIGHT: 68 IN | SYSTOLIC BLOOD PRESSURE: 165 MMHG | TEMPERATURE: 98.3 F

## 2019-02-11 DIAGNOSIS — E78.00 PURE HYPERCHOLESTEROLEMIA: ICD-10-CM

## 2019-02-11 DIAGNOSIS — I10 ESSENTIAL HYPERTENSION, MALIGNANT: ICD-10-CM

## 2019-02-11 RX ORDER — AMLODIPINE BESYLATE 10 MG/1
10 TABLET ORAL DAILY
Qty: 90 TAB | Refills: 4 | Status: SHIPPED | OUTPATIENT
Start: 2019-02-11 | End: 2019-12-31 | Stop reason: SDUPTHER

## 2019-02-11 RX ORDER — LISINOPRIL 20 MG/1
20 TABLET ORAL DAILY
Qty: 90 TAB | Refills: 4 | Status: SHIPPED | OUTPATIENT
Start: 2019-02-11 | End: 2019-12-31 | Stop reason: SDUPTHER

## 2019-02-11 RX ORDER — ATORVASTATIN CALCIUM 40 MG/1
40 TABLET, FILM COATED ORAL DAILY
Qty: 90 TAB | Refills: 4 | Status: SHIPPED | OUTPATIENT
Start: 2019-02-11 | End: 2019-12-31 | Stop reason: SDUPTHER

## 2019-02-11 RX ORDER — GUAIFENESIN 100 MG/5ML
81 LIQUID (ML) ORAL DAILY
Qty: 90 TAB | Refills: 4 | Status: SHIPPED | OUTPATIENT
Start: 2019-02-11

## 2019-02-11 NOTE — PROGRESS NOTES
Sara Kowalski is a 48 y.o.  male and presents with Chief Complaint Patient presents with  Cholesterol Problem  Hypertension  Arthritis Subjective: 
 
Cardiovascular Review: 
The patient has hypertension and hyperlipidemia. Diet and Lifestyle: not attempting to follow a low fat, low cholesterol diet, not attempting to follow a low sodium diet Home BP Monitoring: is not measured at home. Pertinent ROS: taking medications as instructed, no medication side effects noted, no TIA's, no chest pain on exertion, no dyspnea on exertion, no swelling of ankles. Osteoarthritis and Chronic Pain: 
Patient has osteoarthritis, primarily affecting the diffuse. Symptoms onset: problem is longstanding. Rheumatological ROS: no current joint or muscle symptoms, essentially pain-free. Response to treatment plan: stable. Additional Concerns:   
 
 
 
Patient Active Problem List  
 Diagnosis Date Noted  Advance directive discussed with patient 10/06/2016  Arthritis of knee 06/01/2012  Essential hypertension, malignant 12/01/2011  Pure hypercholesterolemia 12/01/2011 Current Outpatient Medications Medication Sig Dispense Refill  lisinopril (PRINIVIL, ZESTRIL) 20 mg tablet Take 1 Tab by mouth daily. 90 Tab 4  
 amLODIPine (NORVASC) 10 mg tablet Take 1 Tab by mouth daily. Take 1 Tab by Mouth Once a Day. 90 Tab 4  
 atorvastatin (LIPITOR) 40 mg tablet Take 1 Tab by mouth daily. Take 1 Tab by Mouth Every Night at Bedtime. 90 Tab 4  
 aspirin 81 mg chewable tablet Take 1 Tab by mouth daily. Take 81 mg by Mouth Once a Day. 90 Tab 4 No Known Allergies Past Medical History:  
Diagnosis Date  Advance directive discussed with patient 10/6/2016  High cholesterol  Hypertension  Other ill-defined conditions(799.89)   
 cardiac monitoring- history of syncope  Pure hypercholesterolemia 12/1/2011  Syncope Past Surgical History: Procedure Laterality Date  HX OTHER SURGICAL    
 cardiac monitoring device implant Family History Problem Relation Age of Onset  Hypertension Mother  Heart Disease Mother  Cancer Mother  Cancer Father Social History Tobacco Use  Smoking status: Never Smoker  Smokeless tobacco: Never Used Substance Use Topics  Alcohol use: No  
 
 
ROS All other systems reviewed and are negative. Objective: 
Vitals:  
 02/11/19 1137 02/11/19 6505 BP: (!) 168/95 (!) 165/98 Pulse: (!) 50 Resp: 18 Temp: 98.3 °F (36.8 °C) TempSrc: Oral   
SpO2: 99% Weight: 201 lb 6.4 oz (91.4 kg) Height: 5' 8\" (1.727 m) PainSc:   0 - No pain   
 
 
 
 
 
 
 
alert, well appearing, and in no distress, oriented to person, place, and time and normal appearing weight Chest - clear to auscultation, no wheezes, rales or rhonchi, symmetric air entry Heart - normal rate, regular rhythm, normal S1, S2, no murmurs, rubs, clicks or gallops Abdomen - soft, nontender, nondistended, no masses or organomegaly LABS Component Latest Ref Rng & Units 7/25/2018 7/25/2018 7/25/2018 7/25/2018 2:45 PM  2:45 PM  2:45 PM  2:45 PM  
WBC 
    4.6 - 13.2 K/uL      
RBC 
    4.70 - 5.50 M/uL      
HGB 13.0 - 16.0 g/dL HCT 
    36.0 - 48.0 % MCV 
    74.0 - 97.0 FL      
MCH 
    24.0 - 34.0 PG      
MCHC 31.0 - 37.0 g/dL      
RDW 
    11.6 - 14.5 % PLATELET 
    039 - 461 K/uL MPV 
    9.2 - 11.8 FL      
NEUTROPHILS 
    40 - 73 % LYMPHOCYTES 
    21 - 52 % MONOCYTES 
    3 - 10 % EOSINOPHILS 
    0 - 5 % BASOPHILS 
    0 - 2 %      
ABS. NEUTROPHILS 
    1.8 - 8.0 K/UL      
ABS. LYMPHOCYTES 
    0.9 - 3.6 K/UL      
ABS. MONOCYTES 
    0.05 - 1.2 K/UL      
ABS. EOSINOPHILS 
    0.0 - 0.4 K/UL      
ABS. BASOPHILS 
    0.0 - 0.1 K/UL      
DF Sodium 136 - 145 mmol/L   140 Potassium 3.5 - 5.5 mmol/L   3.4 (L) Chloride 100 - 108 mmol/L   106 CO2 
    21 - 32 mmol/L   27 Anion gap 3.0 - 18 mmol/L   7 Glucose 74 - 99 mg/dL   96 BUN 
    7.0 - 18 MG/DL   13 Creatinine 
    0.6 - 1.3 MG/DL   1.03   
BUN/Creatinine ratio 12 - 20     13 GFR est AA 
    >60 ml/min/1.73m2   >60   
GFR est non-AA 
    >60 ml/min/1.73m2   >60 Calcium 8.5 - 10.1 MG/DL   8.7 Bilirubin, total 
    0.2 - 1.0 MG/DL   0.4 ALT (SGPT) 16 - 61 U/L   29 AST 
    15 - 37 U/L   16 Alk. phosphatase 45 - 117 U/L   55 Protein, total 
    6.4 - 8.2 g/dL   7.5 Albumin 3.4 - 5.0 g/dL   3.8 Globulin 2.0 - 4.0 g/dL   3.7 A-G Ratio 
    0.8 - 1.7     1.0 Color Appearance Specific gravity 1.005 - 1.030        
pH (UA) 
    5.0 - 8.0 Protein NEG mg/dL Glucose NEG mg/dL Ketone NEG mg/dL Bilirubin NEG Blood NEG Urobilinogen 0.2 - 1.0 EU/dL Nitrites NEG Leukocyte Esterase NEG Cholesterol, total 
    <200 MG/DL    236 (H) Triglyceride 
    <150 MG/DL    289 (H) HDL Cholesterol 40 - 60 MG/DL    43 LDL, calculated 0 - 100 MG/DL    135.2 (H) VLDL, calculated MG/DL    57.8 CHOL/HDL Ratio 0 - 5.0      5.5 (H) WBC 
    0 - 4 /hpf      
RBC 
    0 - 5 /hpf Epithelial cells 0 - 5 /lpf Bacteria NEG /hpf Mucus NEG /lpf Prostate Specific Ag 
    0.0 - 4.0 ng/mL 2.3 TSH 
    0.36 - 3.74 uIU/mL  4.00 (H) Component Latest Ref Rng & Units 7/25/2018 7/25/2018 7/25/2018 2:45 PM  2:45 PM  2:45 PM  
WBC 
    4.6 - 13.2 K/uL   5.3  
RBC 
    4.70 - 5.50 M/uL   4.73 HGB 13.0 - 16.0 g/dL   13.2 HCT 
    36.0 - 48.0 %   39.8 MCV 
    74.0 - 97.0 FL   84.1 MCH 
    24.0 - 34.0 PG   27.9 MCHC 31.0 - 37.0 g/dL   33.2 RDW 
 11.6 - 14.5 %   14.8 (H) PLATELET 
    183 - 971 K/uL   305 MPV 
    9.2 - 11.8 FL   10.0 NEUTROPHILS 
    40 - 73 %   58 LYMPHOCYTES 
    21 - 52 %   35 MONOCYTES 
    3 - 10 %   4 EOSINOPHILS 
    0 - 5 %   2  
BASOPHILS 
    0 - 2 %   1  
ABS. NEUTROPHILS 
    1.8 - 8.0 K/UL   3.1  
ABS. LYMPHOCYTES 
    0.9 - 3.6 K/UL   1.9  
ABS. MONOCYTES 
    0.05 - 1.2 K/UL   0.2  
ABS. EOSINOPHILS 
    0.0 - 0.4 K/UL   0.1 ABS. BASOPHILS 
    0.0 - 0.1 K/UL   0.0  
DF AUTOMATED Sodium 136 - 145 mmol/L Potassium 3.5 - 5.5 mmol/L Chloride 100 - 108 mmol/L     
CO2 
    21 - 32 mmol/L Anion gap 3.0 - 18 mmol/L Glucose 74 - 99 mg/dL BUN 
    7.0 - 18 MG/DL Creatinine 
    0.6 - 1.3 MG/DL     
BUN/Creatinine ratio 12 - 20 GFR est AA 
    >60 ml/min/1.73m2 GFR est non-AA 
    >60 ml/min/1.73m2 Calcium 8.5 - 10.1 MG/DL Bilirubin, total 
    0.2 - 1.0 MG/DL     
ALT (SGPT) 16 - 61 U/L     
AST 
    15 - 37 U/L Alk. phosphatase 45 - 117 U/L Protein, total 
    6.4 - 8.2 g/dL Albumin 3.4 - 5.0 g/dL Globulin 2.0 - 4.0 g/dL A-G Ratio 
    0.8 - 1.7 Color DARK YELLOW Appearance CLEAR Specific gravity 1.005 - 1.030    1.025   
pH (UA) 
    5.0 - 8.0    5.5 Protein NEG mg/dL  TRACE (A) Glucose NEG mg/dL  NEGATIVE Ketone NEG mg/dL  NEGATIVE Bilirubin NEG    NEGATIVE Blood NEG    NEGATIVE Urobilinogen 0.2 - 1.0 EU/dL  1.0 Nitrites NEG    NEGATIVE Leukocyte Esterase NEG    TRACE (A) Cholesterol, total 
    <200 MG/DL Triglyceride 
    <150 MG/DL     
HDL Cholesterol 40 - 60 MG/DL     
LDL, calculated 0 - 100 MG/DL VLDL, calculated MG/DL     
CHOL/HDL Ratio 0 - 5.0 WBC 
    0 - 4 /hpf 4 to 6    
RBC 
    0 - 5 /hpf 0 Epithelial cells 0 - 5 /lpf FEW Bacteria NEG /hpf NEGATIVE Mucus NEG /lpf 3+ (A) Prostate Specific Ag 
    0.0 - 4.0 ng/mL TSH 
    0.36 - 3.74 uIU/mL TESTS Assessment/Plan: Hypertension - needs improvement, patient poorly compliant, f/u 6 mo PE Hyperlipidemia - stable 
arthris aditi note taking aleve for arthris may be aggrevating bp Lab review: labs are reviewed, up to date and normal 
 
Diagnoses and all orders for this visit: 1. Essential hypertension, malignant 
-     amLODIPine (NORVASC) 10 mg tablet; Take 1 Tab by mouth daily. Take 1 Tab by Mouth Once a Day. -     atorvastatin (LIPITOR) 40 mg tablet; Take 1 Tab by mouth daily. Take 1 Tab by Mouth Every Night at Bedtime. 
-     aspirin 81 mg chewable tablet; Take 1 Tab by mouth daily. Take 81 mg by Mouth Once a Day. 2. Pure hypercholesterolemia 
-     amLODIPine (NORVASC) 10 mg tablet; Take 1 Tab by mouth daily. Take 1 Tab by Mouth Once a Day. -     atorvastatin (LIPITOR) 40 mg tablet; Take 1 Tab by mouth daily. Take 1 Tab by Mouth Every Night at Bedtime. 
-     aspirin 81 mg chewable tablet; Take 1 Tab by mouth daily. Take 81 mg by Mouth Once a Day. Other orders 
-     lisinopril (PRINIVIL, ZESTRIL) 20 mg tablet; Take 1 Tab by mouth daily. I have discussed the diagnosis with the patient and the intended plan as seen in the above orders. The patient has received an after-visit summary and questions were answered concerning future plans. I have discussed medication side effects and warnings with the patient as well. I have reviewed the plan of care with the patient, accepted their input and they are in agreement with the treatment goals. Follow-up Disposition: Not on File

## 2019-02-11 NOTE — PROGRESS NOTES
1. Have you been to the ER, urgent care clinic since your last visit? Hospitalized since your last visit? No 
 
2. Have you seen or consulted any other health care providers outside of the [address] [place] [address] since your last visit? Include any pap smears or colon screening.  No

## 2019-12-31 ENCOUNTER — HOSPITAL ENCOUNTER (OUTPATIENT)
Dept: GENERAL RADIOLOGY | Age: 54
Discharge: HOME | End: 2019-12-31
Payer: COMMERCIAL

## 2019-12-31 ENCOUNTER — OFFICE VISIT (OUTPATIENT)
Dept: FAMILY MEDICINE CLINIC | Age: 54
End: 2019-12-31

## 2019-12-31 VITALS
SYSTOLIC BLOOD PRESSURE: 175 MMHG | RESPIRATION RATE: 18 BRPM | HEIGHT: 68 IN | TEMPERATURE: 97.4 F | OXYGEN SATURATION: 98 % | BODY MASS INDEX: 30.16 KG/M2 | HEART RATE: 66 BPM | DIASTOLIC BLOOD PRESSURE: 105 MMHG | WEIGHT: 199 LBS

## 2019-12-31 DIAGNOSIS — Z12.5 SCREENING PSA (PROSTATE SPECIFIC ANTIGEN): ICD-10-CM

## 2019-12-31 DIAGNOSIS — M17.10 ARTHRITIS OF KNEE: ICD-10-CM

## 2019-12-31 DIAGNOSIS — I10 ESSENTIAL HYPERTENSION: ICD-10-CM

## 2019-12-31 DIAGNOSIS — Z12.11 SCREENING FOR MALIGNANT NEOPLASM OF COLON: ICD-10-CM

## 2019-12-31 DIAGNOSIS — H61.22 IMPACTED CERUMEN OF LEFT EAR: ICD-10-CM

## 2019-12-31 DIAGNOSIS — E78.00 PURE HYPERCHOLESTEROLEMIA: ICD-10-CM

## 2019-12-31 DIAGNOSIS — Z11.59 NEED FOR HEPATITIS C SCREENING TEST: ICD-10-CM

## 2019-12-31 DIAGNOSIS — Z00.00 ROUTINE GENERAL MEDICAL EXAMINATION AT A HEALTH CARE FACILITY: Primary | ICD-10-CM

## 2019-12-31 DIAGNOSIS — I10 ESSENTIAL HYPERTENSION, MALIGNANT: ICD-10-CM

## 2019-12-31 PROCEDURE — 73564 X-RAY EXAM KNEE 4 OR MORE: CPT

## 2019-12-31 RX ORDER — AMLODIPINE BESYLATE 10 MG/1
10 TABLET ORAL DAILY
Qty: 90 TAB | Refills: 1 | Status: SHIPPED | OUTPATIENT
Start: 2019-12-31

## 2019-12-31 RX ORDER — LISINOPRIL 20 MG/1
20 TABLET ORAL DAILY
Qty: 90 TAB | Refills: 1 | Status: SHIPPED | OUTPATIENT
Start: 2019-12-31 | End: 2020-01-29 | Stop reason: SDUPTHER

## 2019-12-31 RX ORDER — ATORVASTATIN CALCIUM 40 MG/1
40 TABLET, FILM COATED ORAL DAILY
Qty: 90 TAB | Refills: 1 | Status: SHIPPED | OUTPATIENT
Start: 2019-12-31

## 2019-12-31 NOTE — PROGRESS NOTES
ANNUAL PHYSICAL EXAMINATION    History of Present Illness  Eusebio Martin is a 47 y.o. male who presents today for management of    Chief Complaint   Patient presents with    Hypertension    Establish Care    Complete Physical     Patient is here to establish care and for complete physical.    Cardiovascular Review:  The patient has hypertension and hyperlipidemia. Diet and Lifestyle: not attempting to follow a low fat, low cholesterol diet, not attempting to follow a low sodium diet, sedentary, nonsmoker  Home BP Monitoring: is not measured at home. Pertinent ROS: not taking medications regularly as instructed, no medication side effects noted, no TIA's, no chest pain on exertion, no dyspnea on exertion, no swelling of ankles. Patient complains of chronic right knee pain. Onset was several years ago, gradually worsening since that time. He had an Xray 3 years ago which showed moderate OA. He takes Tylenol as needed. Health Maintenance  Colon cancer: due at age 48  Dyslipidemia: due  Diabetes mellitus: due  Influenza vaccine: declined  Pneumococcal vaccine: not indicated  Tdap: uptodate  Herpes Zoster vaccine: declined  Hep B vaccine: not indicated    Weight:  Body mass index is Estimated body mass index is Body mass index is 30.26 kg/m². Elisha Valadez Discussed the patient's BMI with him.  The BMI follow up plan is as follows: Improve diet and 30 min of moderate activity at least 5 times a week.   Diet: no  Exercise: no  Seatbelt: yes  Sunscreen: no      Past Medical History  Past Medical History:   Diagnosis Date    Hypertension     Other ill-defined conditions(799.89)     cardiac monitoring- history of syncope    Pure hypercholesterolemia 12/1/2011    Syncope         Surgical History  Past Surgical History:   Procedure Laterality Date    HX OTHER SURGICAL      cardiac monitoring device implant        Current Medications  Current Outpatient Medications   Medication Sig    lisinopril (Elenore Evans) 20 mg tablet Take 1 Tab by mouth daily.  amLODIPine (NORVASC) 10 mg tablet Take 1 Tab by mouth daily. Take 1 Tab by Mouth Once a Day.  atorvastatin (LIPITOR) 40 mg tablet Take 1 Tab by mouth daily. Take 1 Tab by Mouth Every Night at Bedtime.  aspirin 81 mg chewable tablet Take 1 Tab by mouth daily. Take 81 mg by Mouth Once a Day. No current facility-administered medications for this visit.         Allergies/Drug Reactions  No Known Allergies     Family History  Family History   Problem Relation Age of Onset    Hypertension Mother     Heart Disease Mother     Cancer Mother     Cancer Father         Social History  Social History     Socioeconomic History    Marital status:      Spouse name: Not on file    Number of children: Not on file    Years of education: Not on file    Highest education level: Not on file   Occupational History    Not on file   Social Needs    Financial resource strain: Not on file    Food insecurity:     Worry: Not on file     Inability: Not on file    Transportation needs:     Medical: Not on file     Non-medical: Not on file   Tobacco Use    Smoking status: Never Smoker    Smokeless tobacco: Never Used   Substance and Sexual Activity    Alcohol use: No    Drug use: No    Sexual activity: Yes   Lifestyle    Physical activity:     Days per week: Not on file     Minutes per session: Not on file    Stress: Not on file   Relationships    Social connections:     Talks on phone: Not on file     Gets together: Not on file     Attends Anglican service: Not on file     Active member of club or organization: Not on file     Attends meetings of clubs or organizations: Not on file     Relationship status: Not on file    Intimate partner violence:     Fear of current or ex partner: Not on file     Emotionally abused: Not on file     Physically abused: Not on file     Forced sexual activity: Not on file   Other Topics Concern    Not on file   Social History Narrative    Not on file       Health Maintenance   Topic Date Due    Hepatitis C Screening  1965    FOBT Q 1 YEAR AGE 50-75  12/16/2015    Shingrix Vaccine Age 50> (1 of 2) 04/06/2020 (Originally 12/16/2015)    Influenza Age 5 to Adult  09/01/2020 (Originally 8/1/2019)    DTaP/Tdap/Td series (2 - Td) 06/01/2022    Pneumococcal 0-64 years  Aged [employer] History   Administered Date(s) Administered    Influenza Vaccine (Quad) PF 02/09/2016, 10/06/2016    Pneumococcal Polysaccharide (PPSV-23) 02/09/2016    TDAP Vaccine 06/01/2012       Review of Systems  General ROS: negative for - chills, fatigue or fever  Psychological ROS: negative for - anxiety or depression  Ophthalmic ROS: negative  ENT ROS: negative  Allergy and Immunology ROS: negative  Hematological and Lymphatic ROS: negative  Endocrine ROS: negative  Breast ROS: negative for breast lumps  Respiratory ROS: no cough, shortness of breath, or wheezing  Cardiovascular ROS: no chest pain or dyspnea on exertion  Gastrointestinal ROS: no abdominal pain, change in bowel habits, or black or bloody stools  Genito-Urinary ROS: no dysuria, trouble voiding, or hematuria  Musculoskeletal ROS: positive for - joint pain  negative for - joint swelling, muscle pain or muscular weakness  Neurological ROS: negative  Dermatological ROS: negative       Hearing Screening    125Hz 250Hz 500Hz 1000Hz 2000Hz 3000Hz 4000Hz 6000Hz 8000Hz   Right ear:   Pass Pass Pass  Pass     Left ear:   Pass Pass Pass  Pass        Visual Acuity Screening    Right eye Left eye Both eyes   Without correction: 20/25 20/25 20/20   With correction:            Physical Exam  Vital signs:   Vitals:    12/31/19 0830   BP: (!) 175/105   Pulse: 66   Resp: 18   Temp: 97.4 °F (36.3 °C)   TempSrc: Oral   SpO2: 98%   Weight: 199 lb (90.3 kg)   Height: 5' 8\" (1.727 m)       General: alert, oriented, not in distress  Head: scalp normal, atraumatic  Eyes: pupils are equal and reactive, full and intact EOM's  Ears: RIGHT: patent ear canal, intact tympanic membrane, LEFT: impacted cerumen completely occluding ear canal  Nose: normal turbinates, no congestion or discharge  Lips/Mouth: moist lips and buccal mucosa, non-enlarged tonsils, pink throat  Neck: supple, no JVD, no lymphadenopathy, non-palpable thyroid  Chest/Lungs: clear breath sounds, no wheezing or crackles  Heart: normal rate, regular rhythm, no murmur  Abdomen: soft, non-distended, non-tender, normal bowel sounds, no organomegaly, no masses  Extremities: no focal deformities, no edema  Skin: no active skin lesions  YURY: Perineum normal to visual inspection, no erythema or irritation. Sphincter with good tone. Rectum with no hemorrhoids, fissures or masses. Prostate smooth, symmetric and anodular, soft. Prostate is normal in size, non-tender on exam.       Laboratory/Tests:      Assessment/Plan:      1. Routine general medical examination at a health care facility  - Patient Counseling:  --Nutrition: Stressed importance of moderation in sodium/caffeine intake, saturated fat and cholesterol, caloric balance, sufficient intake of fresh fruits, vegetables  --Discussed daily use of baby aspirin. --Exercise: Stressed the importance of regular exercise. --Substance Abuse: Discussed cessation/primary prevention of tobacco, alcohol, or other drug use; driving or other dangerous activities under the influence; availability of treatment for abuse. --Injury prevention: Discussed safety belts, safety helmets, smoke detector, smoking near bedding or upholstery. --Dental health: Discussed importance of regular tooth brushing, flossing, and dental visits. --Immunizations reviewed. --Discussed benefits of screening colonoscopy. - CBC WITH AUTOMATED DIFF; Future  - HEMOGLOBIN A1C WITH EAG; Future  - LIPID PANEL; Future  - METABOLIC PANEL, COMPREHENSIVE; Future  - URINALYSIS W/ RFLX MICROSCOPIC; Future    2.  Essential hypertension  - poorly controlled due to poor compliance with meds  - restart medications  - LIPID PANEL; Future  - METABOLIC PANEL, COMPREHENSIVE; Future  - TSH 3RD GENERATION; Future  - URINALYSIS W/ RFLX MICROSCOPIC; Future  - lisinopril (PRINIVIL, ZESTRIL) 20 mg tablet; Take 1 Tab by mouth daily. Dispense: 90 Tab; Refill: 1  - amLODIPine (NORVASC) 10 mg tablet; Take 1 Tab by mouth daily. Take 1 Tab by Mouth Once a Day. Dispense: 90 Tab; Refill: 1    3. Pure hypercholesterolemia  - likely uncontrolled due to poor compliance with medications  - LIPID PANEL; Future  - METABOLIC PANEL, COMPREHENSIVE; Future  - TSH 3RD GENERATION; Future  - atorvastatin (LIPITOR) 40 mg tablet; Take 1 Tab by mouth daily. Take 1 Tab by Mouth Every Night at Bedtime. Dispense: 90 Tab; Refill: 1    4. Screening PSA (prostate specific antigen)  - PSA SCREENING (SCREENING); Future    5. Need for hepatitis C screening test  - HCV AB W/RFLX TO HARITHA; Future    6. Impacted cerumen, left  - Some cerumen were removed using irrigation and lavage. Advise patient to use Debrox for residual cerumen. Will re-evaluate next month if more irrigration is appropriate. 7. Screening for malignant neoplasm of colon  - REFERRAL TO GASTROENTEROLOGY    8. Arthritis of knee  - XR KNEE RT 3 V; Future  - NSAID as needed      Follow-up and Dispositions    · Return in about 4 weeks (around 1/28/2020) for results, BP check. I have discussed the diagnosis with the patient and the intended plan as seen in the above orders. The patient has received an after-visit summary and questions were answered concerning future plans. I have discussed medication side effects and warnings with the patient as well. I have reviewed the plan of care with the patient, accepted their input and they are in agreement with the treatment goals.        Marija Ro MD  December 31, 2019

## 2019-12-31 NOTE — PROGRESS NOTES
Kathy Turner is a 47 y.o. male who presents today for est care, previous PCP Dr. Jerzy Kam. 1. Have you been to the ER, urgent care clinic since your last visit? Hospitalized since your last visit? no    2. Have you seen or consulted any other health care providers outside of the [address] [place] [address] since your last visit? Include any pap smears or colon screening. no     Health Maintenance reviewed.     Health Maintenance Due   Topic Date Due    Hepatitis C Screening  1965    Shingrix Vaccine Age 50> (1 of 2) 12/16/2015    FOBT Q 1 YEAR AGE 50-75  12/16/2015    Influenza Age 9 to Adult  08/01/2019

## 2020-01-01 LAB
A-G RATIO,AGRAT: 1.8 RATIO (ref 1.1–2.6)
ABSOLUTE LYMPHOCYTE COUNT, 10803: 1.3 K/UL (ref 1–4.8)
ALBUMIN SERPL-MCNC: 4.5 G/DL (ref 3.5–5)
ALP SERPL-CCNC: 51 U/L (ref 25–115)
ALT SERPL-CCNC: 25 U/L (ref 5–40)
ANION GAP SERPL CALC-SCNC: 15 MMOL/L
AST SERPL W P-5'-P-CCNC: 21 U/L (ref 10–37)
AVG GLU, 10930: 111 MG/DL (ref 91–123)
BASOPHILS # BLD: 0 K/UL (ref 0–0.2)
BASOPHILS NFR BLD: 1 % (ref 0–2)
BILIRUB SERPL-MCNC: 0.4 MG/DL (ref 0.2–1.2)
BILIRUB UR QL: NEGATIVE
BUN SERPL-MCNC: 19 MG/DL (ref 6–22)
CALCIUM SERPL-MCNC: 9.1 MG/DL (ref 8.4–10.5)
CHLORIDE SERPL-SCNC: 105 MMOL/L (ref 98–110)
CHOLEST SERPL-MCNC: 237 MG/DL (ref 110–200)
CO2 SERPL-SCNC: 23 MMOL/L (ref 20–32)
CREAT SERPL-MCNC: 1.2 MG/DL (ref 0.5–1.2)
EOSINOPHIL # BLD: 0.1 K/UL (ref 0–0.5)
EOSINOPHIL NFR BLD: 2 % (ref 0–6)
EPITHELIAL,EPSU: ABNORMAL /HPF (ref 0–2)
ERYTHROCYTE [DISTWIDTH] IN BLOOD BY AUTOMATED COUNT: 15.9 % (ref 10–15.5)
GFRAA, 66117: >60
GFRNA, 66118: >60
GLOBULIN,GLOB: 2.5 G/DL (ref 2–4)
GLUCOSE SERPL-MCNC: 92 MG/DL (ref 70–99)
GLUCOSE UR QL: NEGATIVE MG/DL
GRANULOCYTES,GRANS: 67 % (ref 40–75)
HBA1C MFR BLD HPLC: 5.5 % (ref 4.8–5.6)
HCT VFR BLD AUTO: 43.3 % (ref 39.3–51.6)
HCV AB SER IA-ACNC: NORMAL
HDLC SERPL-MCNC: 44 MG/DL
HDLC SERPL-MCNC: 5.4 MG/DL (ref 0–5)
HGB BLD-MCNC: 13.2 G/DL (ref 13.1–17.2)
HGB UR QL STRIP: NEGATIVE
KETONES UR QL STRIP.AUTO: NEGATIVE MG/DL
LDL/HDL RATIO,LDHD: 3.1
LDLC SERPL CALC-MCNC: 134 MG/DL (ref 50–99)
LEUKOCYTE ESTERASE: NEGATIVE
LYMPHOCYTES, LYMLT: 23 % (ref 20–45)
MCH RBC QN AUTO: 28 PG (ref 26–34)
MCHC RBC AUTO-ENTMCNC: 31 G/DL (ref 31–36)
MCV RBC AUTO: 91 FL (ref 80–95)
MONOCYTES # BLD: 0.4 K/UL (ref 0.1–1)
MONOCYTES NFR BLD: 7 % (ref 3–12)
NEUTROPHILS # BLD AUTO: 3.9 K/UL (ref 1.8–7.7)
NITRITE UR QL STRIP.AUTO: NEGATIVE
NON-HDL CHOLESTEROL, 011976: 193 MG/DL
PH UR STRIP: 6 PH (ref 5–8)
PLATELET # BLD AUTO: 285 K/UL (ref 140–440)
PMV BLD AUTO: 10.1 FL (ref 9–13)
POTASSIUM SERPL-SCNC: 3.7 MMOL/L (ref 3.5–5.5)
PROT SERPL-MCNC: 7 G/DL (ref 6.4–8.3)
PROT UR QL STRIP: NEGATIVE MG/DL
PSA SERPL-MCNC: 2.75 NG/ML
RBC # BLD AUTO: 4.76 M/UL (ref 3.8–5.8)
RBC #/AREA URNS HPF: ABNORMAL /HPF
SODIUM SERPL-SCNC: 143 MMOL/L (ref 133–145)
SP GR UR: 1.02 (ref 1–1.03)
TRIGL SERPL-MCNC: 296 MG/DL (ref 40–149)
TSH SERPL DL<=0.005 MIU/L-ACNC: 3.01 MCU/ML (ref 0.27–4.2)
UROBILINOGEN UR STRIP-MCNC: 2 MG/DL
VLDLC SERPL CALC-MCNC: 59 MG/DL (ref 8–30)
WBC # BLD AUTO: 5.8 K/UL (ref 4–11)
WBC URNS QL MICRO: ABNORMAL /HPF (ref 0–2)

## 2020-01-29 ENCOUNTER — OFFICE VISIT (OUTPATIENT)
Dept: FAMILY MEDICINE CLINIC | Age: 55
End: 2020-01-29

## 2020-01-29 VITALS
RESPIRATION RATE: 18 BRPM | BODY MASS INDEX: 29.86 KG/M2 | HEART RATE: 56 BPM | SYSTOLIC BLOOD PRESSURE: 170 MMHG | WEIGHT: 197 LBS | TEMPERATURE: 97.4 F | OXYGEN SATURATION: 95 % | HEIGHT: 68 IN | DIASTOLIC BLOOD PRESSURE: 100 MMHG

## 2020-01-29 DIAGNOSIS — E78.00 PURE HYPERCHOLESTEROLEMIA: ICD-10-CM

## 2020-01-29 DIAGNOSIS — M17.10 ARTHRITIS OF KNEE: ICD-10-CM

## 2020-01-29 DIAGNOSIS — R10.30 LOWER ABDOMINAL PAIN: ICD-10-CM

## 2020-01-29 DIAGNOSIS — R22.2 ABDOMINAL WALL LUMP: ICD-10-CM

## 2020-01-29 DIAGNOSIS — I10 ESSENTIAL HYPERTENSION: Primary | ICD-10-CM

## 2020-01-29 RX ORDER — LISINOPRIL 40 MG/1
40 TABLET ORAL DAILY
Qty: 90 TAB | Refills: 3 | Status: SHIPPED | OUTPATIENT
Start: 2020-01-29

## 2020-01-29 NOTE — PROGRESS NOTES
Valeria Talbot is a 47 y.o. male who presents today for lab results, htn, lower abd swelling      1. Have you been to the ER, urgent care clinic since your last visit? Hospitalized since your last visit? no    2. Have you seen or consulted any other health care providers outside of the [address] [place] [address] since your last visit? Include any pap smears or colon screening. no     Health Maintenance reviewed.     Health Maintenance Due   Topic Date Due    FOBT Q 1 YEAR AGE 50-75  12/16/2015

## 2020-01-29 NOTE — PROGRESS NOTES
History of Present Illness  Popeye Minaya is a 47 y.o. male who presents today for management of    Chief Complaint   Patient presents with    Hypertension    Cholesterol Problem       Cardiovascular Review:  The patient has hypertension and hyperlipidemia. Diet and Lifestyle: generally follows a low fat low cholesterol diet, generally follows a low sodium diet, exercises sporadically, nonsmoker  Home BP Monitoring: is not measured at home. Pertinent ROS: taking medications as instructed, no medication side effects noted, no TIA's, no chest pain on exertion, no dyspnea on exertion, no swelling of ankles. Abdominal Pain  Patient complains of abdominal pain. The pain is described as aching and dull, and is 6/10 in intensity. Pain is located in the hypogastric without radiation. Onset was 1 month ago. Symptoms have been unchanged since. It is associated with \"swelling\" on the lower abdomen. Aggravating factors: urination. Alleviating factors: none. Associated symptoms: none. The patient denies anorexia, chills, constipation, diarrhea, dysuria, fever, headache, hematuria, melena, urinary frequency, urinary urgency and vomiting. Problem List  Patient Active Problem List    Diagnosis Date Noted    Advance directive discussed with patient 10/06/2016    Arthritis of knee 06/01/2012    Essential hypertension 12/01/2011    Pure hypercholesterolemia 12/01/2011       Past Medical History  Past Medical History:   Diagnosis Date    Hypertension     Other ill-defined conditions(799.89)     cardiac monitoring- history of syncope    Pure hypercholesterolemia 12/1/2011    Syncope         Surgical History  Past Surgical History:   Procedure Laterality Date    HX OTHER SURGICAL      cardiac monitoring device implant        Current Medications  Current Outpatient Medications   Medication Sig    lisinopril (PRINIVIL, ZESTRIL) 40 mg tablet Take 1 Tab by mouth daily.     amLODIPine (NORVASC) 10 mg tablet Take 1 Tab by mouth daily. Take 1 Tab by Mouth Once a Day.  atorvastatin (LIPITOR) 40 mg tablet Take 1 Tab by mouth daily. Take 1 Tab by Mouth Every Night at Bedtime.  aspirin 81 mg chewable tablet Take 1 Tab by mouth daily. Take 81 mg by Mouth Once a Day. No current facility-administered medications for this visit.         Allergies/Drug Reactions  No Known Allergies     Family History  Family History   Problem Relation Age of Onset    Hypertension Mother     Heart Disease Mother     Cancer Mother     Cancer Father         Social History  Social History     Socioeconomic History    Marital status:      Spouse name: Not on file    Number of children: Not on file    Years of education: Not on file    Highest education level: Not on file   Occupational History    Not on file   Social Needs    Financial resource strain: Not on file    Food insecurity:     Worry: Not on file     Inability: Not on file    Transportation needs:     Medical: Not on file     Non-medical: Not on file   Tobacco Use    Smoking status: Never Smoker    Smokeless tobacco: Never Used   Substance and Sexual Activity    Alcohol use: No    Drug use: No    Sexual activity: Yes   Lifestyle    Physical activity:     Days per week: Not on file     Minutes per session: Not on file    Stress: Not on file   Relationships    Social connections:     Talks on phone: Not on file     Gets together: Not on file     Attends Holiness service: Not on file     Active member of club or organization: Not on file     Attends meetings of clubs or organizations: Not on file     Relationship status: Not on file    Intimate partner violence:     Fear of current or ex partner: Not on file     Emotionally abused: Not on file     Physically abused: Not on file     Forced sexual activity: Not on file   Other Topics Concern    Not on file   Social History Narrative    Not on file       Review of Systems  Review of Systems   Constitutional: Negative. HENT: Negative. Respiratory: Negative. Cardiovascular: Negative. Gastrointestinal: Positive for abdominal pain. Negative for constipation, diarrhea, nausea and vomiting. Genitourinary: Negative. Musculoskeletal: Positive for joint pain. Neurological: Negative. Physical Exam  Vital signs:   Vitals:    01/29/20 0859 01/29/20 0921   BP: 147/83 (!) 170/100   Pulse: (!) 56    Resp: 18    Temp: 97.4 °F (36.3 °C)    TempSrc: Oral    SpO2: 95%    Weight: 197 lb (89.4 kg)    Height: 5' 8\" (1.727 m)        General: alert, oriented, not in distress  Eyes: clear conjunctivae, anicteric sclerae, full and equal ROMs  Chest/Lungs: clear breath sounds, no wheezing or crackles  Heart: normal rate, regular rhythm, no murmur  Abdomen: soft, (+) palpable nontender soft tissue mass bilaterally on the lower abdomen, non-distended, non-tender, normal bowel sounds, no organomegaly, no masses  Extremities: no focal deformities, no edema  Neuro: AAOx3, CN's grossly intact  Skin: no visible abnormalities    Laboratory/Tests:    Component      Latest Ref Rng & Units 12/31/2019 12/31/2019 12/31/2019 12/31/2019           9:21 AM  9:21 AM  9:21 AM  9:21 AM   Glucose      70 - 99 mg/dL  92     BUN      6 - 22 mg/dL  19     Creatinine      0.5 - 1.2 mg/dL  1.2     Sodium      133 - 145 mmol/L  143     Potassium      3.5 - 5.5 mmol/L  3.7     Chloride      98 - 110 mmol/L  105     CO2      20 - 32 mmol/L  23     AST      10 - 37 U/L  21     ALT (SGPT)      5 - 40 U/L  25     Alk.  phosphatase      25 - 115 U/L  51     Bilirubin, total      0.2 - 1.2 mg/dL  0.4     Calcium      8.4 - 10.5 mg/dL  9.1     Protein, total      6.4 - 8.3 g/dL  7.0     Albumin      3.5 - 5.0 g/dL  4.5     A-G Ratio      1.1 - 2.6 ratio  1.8     Globulin      2.0 - 4.0 g/dL  2.5     Anion gap      mmol/L  15.0     GFRAA      >60.0  >60.0     GFRNA      >60.0  >60.0     Specific Gravity      1.005 - 1.03    1.018   pH (UA)      5.0 - 8.0 pH    6.0   Protein      Negative, mg/dL    Negative   Glucose      Negative mg/dL    Negative   Ketone      Negative, mg/dL    Negative   Bilirubin      Negative    Negative   Blood      Negative    Negative   Nitrites      Negative    Negative   Leukocyte Esterase      Negative    Negative   Urobilinogen      <2.0 mg/dL    2.0 (H)   WBC      0 - 2 /hpf    0-2   RBC      Negative, /hpf    0-2   Epithelial cells      0 - 2 /hpf    0-2   TSH      0.27 - 4.20 mcU/mL 3.01      Prostate Specific Ag      <=3.100 ng/mL   2.750      Component      Latest Ref Rng & Units 12/31/2019 12/31/2019 12/31/2019           9:21 AM  9:21 AM  9:21 AM   WBC      4.0 - 11.0 K/uL      RBC      3.80 - 5.80 M/uL      HGB      13.1 - 17.2 g/dL      HCT      39.3 - 51.6 %      MCV      80 - 95 fL      MCH      26 - 34 pg      MCHC      31 - 36 g/dL      RDW      10.0 - 15.5 %      PLATELET      280 - 755 K/uL      MPV      9.0 - 13.0 fL      NEUTROPHILS      40 - 75 %      Lymphocytes      20 - 45 %      MONOCYTES      3 - 12 %      EOSINOPHILS      0 - 6 %      BASOPHILS      0 - 2 %      ABS. NEUTROPHILS      1.8 - 7.7 K/uL      ABSOLUTE LYMPHOCYTE COUNT      1.0 - 4.8 K/uL      ABS. MONOCYTES      0.1 - 1.0 K/uL      ABS. EOSINOPHILS      0.0 - 0.5 K/uL      ABS.  BASOPHILS      0.0 - 0.2 K/uL      Triglyceride      40 - 149 mg/dL  296 (H)    HDL Cholesterol      >=40 mg/dL  44    Cholesterol, total      110 - 200 mg/dL  237 (H)    CHOLESTEROL/HDL      0.0 - 5.0  5.4    Non-HDL Cholesterol      <130 mg/dL  193 (H)    LDL, calculated      50 - 99 mg/dL  134 (H)    VLDL, calculated      8 - 30 mg/dL  59 (H)    LDL/HDL Ratio        3.1    Hemoglobin A1c, (calculated)      4.8 - 5.6 %   5.5   AVG GLU      91 - 123 mg/dL   111   Hep C Virus Ab      None Detec None Detected       Component      Latest Ref Rng & Units 12/31/2019           9:21 AM   WBC      4.0 - 11.0 K/uL 5.8   RBC      3.80 - 5.80 M/uL 4.76   HGB      13.1 - 17.2 g/dL 13.2 HCT      39.3 - 51.6 % 43.3   MCV      80 - 95 fL 91   MCH      26 - 34 pg 28   MCHC      31 - 36 g/dL 31   RDW      10.0 - 15.5 % 15.9 (H)   PLATELET      352 - 865 K/uL 285   MPV      9.0 - 13.0 fL 10.1   NEUTROPHILS      40 - 75 % 67   Lymphocytes      20 - 45 % 23   MONOCYTES      3 - 12 % 7   EOSINOPHILS      0 - 6 % 2   BASOPHILS      0 - 2 % 1   ABS. NEUTROPHILS      1.8 - 7.7 K/uL 3.9   ABSOLUTE LYMPHOCYTE COUNT      1.0 - 4.8 K/uL 1.3   ABS. MONOCYTES      0.1 - 1.0 K/uL 0.4   ABS. EOSINOPHILS      0.0 - 0.5 K/uL 0.1   ABS. BASOPHILS      0.0 - 0.2 K/uL 0.0   Triglyceride      40 - 149 mg/dL    HDL Cholesterol      >=40 mg/dL    Cholesterol, total      110 - 200 mg/dL    CHOLESTEROL/HDL      0.0 - 5.0    Non-HDL Cholesterol      <130 mg/dL    LDL, calculated      50 - 99 mg/dL    VLDL, calculated      8 - 30 mg/dL    LDL/HDL Ratio          Hemoglobin A1c, (calculated)      4.8 - 5.6 %    AVG GLU      91 - 123 mg/dL    Hep C Virus Ab      None Detec        Assessment/Plan:    1. Essential hypertension  - needs better control  - increase  lisinopril (PRINIVIL, ZESTRIL) 40 mg tablet; Take 1 Tab by mouth daily. Dispense: 90 Tab; Refill: 3  - continue amlodipine    2. Pure hypercholesterolemia  - continue Lipitor    3. Arthritis of knee  - Tylenol or NSAID as needed    4. Lower abdominal pain, Abdominal wall lump  - rule out hernia  - CT ABD PELV W WO CONT; Future    Follow-up and Dispositions    · Return in about 4 weeks (around 2/26/2020) for BP check. I have discussed the diagnosis with the patient and the intended plan as seen in the above orders. The patient has received an after-visit summary and questions were answered concerning future plans. I have discussed medication side effects and warnings with the patient as well. I have reviewed the plan of care with the patient, accepted their input and they are in agreement with the treatment goals.        Itzel Soto MD  January 29, 2020

## 2020-02-10 ENCOUNTER — HOSPITAL ENCOUNTER (OUTPATIENT)
Dept: CT IMAGING | Age: 55
Discharge: HOME | End: 2020-02-10
Attending: INTERNAL MEDICINE
Payer: COMMERCIAL

## 2020-02-10 DIAGNOSIS — R10.30 LOWER ABDOMINAL PAIN: ICD-10-CM

## 2020-02-10 DIAGNOSIS — R22.2 ABDOMINAL WALL LUMP: ICD-10-CM

## 2020-02-10 LAB — CREAT UR-MCNC: 1.2 MG/DL (ref 0.6–1.3)

## 2020-02-10 PROCEDURE — 74011000255 HC RX REV CODE- 255: Performed by: INTERNAL MEDICINE

## 2020-02-10 PROCEDURE — 82565 ASSAY OF CREATININE: CPT

## 2020-02-10 PROCEDURE — 74177 CT ABD & PELVIS W/CONTRAST: CPT

## 2020-02-10 PROCEDURE — 74011636320 HC RX REV CODE- 636/320: Performed by: INTERNAL MEDICINE

## 2020-02-10 RX ORDER — BARIUM SULFATE 20 MG/ML
900 SUSPENSION ORAL
Status: COMPLETED | OUTPATIENT
Start: 2020-02-10 | End: 2020-02-10

## 2020-02-10 RX ADMIN — IOPAMIDOL 96 ML: 612 INJECTION, SOLUTION INTRAVENOUS at 11:11

## 2020-02-10 RX ADMIN — BARIUM SULFATE 900 ML: 20 SUSPENSION ORAL at 11:10

## 2020-02-28 ENCOUNTER — TELEPHONE (OUTPATIENT)
Dept: FAMILY MEDICINE CLINIC | Age: 55
End: 2020-02-28

## 2020-02-28 DIAGNOSIS — K42.9 UMBILICAL HERNIA WITHOUT OBSTRUCTION AND WITHOUT GANGRENE: Primary | ICD-10-CM

## 2020-02-28 DIAGNOSIS — K40.20 NON-RECURRENT BILATERAL INGUINAL HERNIA WITHOUT OBSTRUCTION OR GANGRENE: ICD-10-CM

## 2020-02-28 NOTE — TELEPHONE ENCOUNTER
Please inform patient that CT of the abdomen showed bilateral small fat-containing inguinal hernia and a small fat-containing umbilical hernia. If he has persistent and significant pain, I can send him to general surgery. Other, we can just monitor these.

## 2020-03-02 NOTE — TELEPHONE ENCOUNTER
Patient called, spoke with him about hernias. He verbalized understanding and would like to proceed with the referral to general surgery.  Thank you

## 2020-03-09 ENCOUNTER — OFFICE VISIT (OUTPATIENT)
Dept: SURGERY | Age: 55
End: 2020-03-09

## 2020-03-09 VITALS
BODY MASS INDEX: 29.25 KG/M2 | HEIGHT: 68 IN | OXYGEN SATURATION: 97 % | DIASTOLIC BLOOD PRESSURE: 92 MMHG | SYSTOLIC BLOOD PRESSURE: 162 MMHG | HEART RATE: 58 BPM | WEIGHT: 193 LBS | TEMPERATURE: 97.6 F

## 2020-03-09 DIAGNOSIS — K40.90 LEFT INGUINAL HERNIA: ICD-10-CM

## 2020-03-09 DIAGNOSIS — K40.90 RIGHT INGUINAL HERNIA: Primary | ICD-10-CM

## 2020-03-09 NOTE — H&P
General Surgery Consult      Nadir Hampton  Admit date: (Not on file)    MRN: [id number]     : 1965     Age: 47 y.o. Attending Physician: Selena Valle MD, [facility]      History of Present Illness:     Nadir Hampton is a 47 y.o. male was referred by for evaluation of symptomatic bilateral inguinal hernias. He said that the pain has been there for about 2 months and he has noticed large bulge on both side especially on the left side that is larger than the right side. He said that he is able to push both of them back but they would come out as soon as he puts them back. He said that he does a lot of heavy lifting and he works in construction. Amazingly he had a CT scan that showed a small umbilical hernia which is a completely asymptomatic but it did not show the bilateral inguinal hernias. He said that the pain is localized to the bilateral groin and it is more of a discomfort. He denies any nausea or vomiting or any change in bowel habits. He denies any previous abdominal surgeries.     Patient Active Problem List    Diagnosis Date Noted    Advance directive discussed with patient 10/06/2016    Arthritis of knee 2012    Essential hypertension 2011    Pure hypercholesterolemia 2011     Past Medical History:   Diagnosis Date    Hypertension     Other ill-defined conditions(799.89)     cardiac monitoring- history of syncope    Pure hypercholesterolemia 2011    Syncope       Past Surgical History:   Procedure Laterality Date    HX OTHER SURGICAL      cardiac monitoring device implant      Social History     Tobacco Use    Smoking status: Never Smoker    Smokeless tobacco: Never Used   Substance Use Topics    Alcohol use: Yes     Comment: occ      Social History     Tobacco Use   Smoking Status Never Smoker   Smokeless Tobacco Never Used     Family History   Problem Relation Age of Onset    Hypertension Mother     Heart Disease Mother     Cancer Mother  Cancer Father       Current Outpatient Medications   Medication Sig    lisinopril (PRINIVIL, ZESTRIL) 40 mg tablet Take 1 Tab by mouth daily.  amLODIPine (NORVASC) 10 mg tablet Take 1 Tab by mouth daily. Take 1 Tab by Mouth Once a Day.  atorvastatin (LIPITOR) 40 mg tablet Take 1 Tab by mouth daily. Take 1 Tab by Mouth Every Night at Bedtime.  aspirin 81 mg chewable tablet Take 1 Tab by mouth daily. Take 81 mg by Mouth Once a Day. No current facility-administered medications for this visit. No Known Allergies     Review of Systems:  Constitutional: negative  Eyes: negative  Ears, Nose, Mouth, Throat, and Face: negative  Respiratory: negative  Cardiovascular: negative  Gastrointestinal: positive for Bilateral groin pain and bulges  Genitourinary:negative  Integument/Breast: negative  Hematologic/Lymphatic: negative  Musculoskeletal:negative  Neurological: negative  Behavioral/Psychiatric: negative  Endocrine: negative  Allergic/Immunologic: negative    Objective:     Visit Vitals  BP (!) 162/92 (BP 1 Location: Right arm, BP Patient Position: Sitting)   Pulse (!) 58   Temp 97.6 °F (36.4 °C) (Oral)   Ht 5' 8\" (1.727 m)   Wt 87.5 kg (193 lb)   SpO2 97%   BMI 29.35 kg/m²       Physical Exam:      General:  in no apparent distress, alert, oriented times 3 and cooperative   Eyes:  conjunctivae and sclerae normal, pupils equal, round, reactive to light   Throat & Neck: no erythema or exudates noted and neck supple and symmetrical; no palpable masses   Lungs:   clear to auscultation bilaterally   Heart:  Regular rate and rhythm   Abdomen:   rounded, soft, nontender, nondistended, no masses or organomegaly. Is a very small umbilical hernia less than half centimeter in size that is nontender. There is a large left inguinal hernia as well as a right inguinal hernia bulge. Both are mildly tender to palpation but easily reducible. However both were treated as soon as the pressure is gone. Extremities: extremities normal, atraumatic, no cyanosis or edema   Skin: Normal.       Imaging and Lab Review:     CBC:   Lab Results   Component Value Date/Time    WBC 5.8 12/31/2019 09:21 AM    RBC 4.76 12/31/2019 09:21 AM    HGB 13.2 12/31/2019 09:21 AM    HCT 43.3 12/31/2019 09:21 AM    PLATELET 648 66/80/6536 09:21 AM     BMP:   Lab Results   Component Value Date/Time    Glucose 92 12/31/2019 09:21 AM    Sodium 143 12/31/2019 09:21 AM    Potassium 3.7 12/31/2019 09:21 AM    Chloride 105 12/31/2019 09:21 AM    CO2 23 12/31/2019 09:21 AM    BUN 19 12/31/2019 09:21 AM    Creatinine 1.2 12/31/2019 09:21 AM    Calcium 9.1 12/31/2019 09:21 AM     CMP:  Lab Results   Component Value Date/Time    Glucose 92 12/31/2019 09:21 AM    Sodium 143 12/31/2019 09:21 AM    Potassium 3.7 12/31/2019 09:21 AM    Chloride 105 12/31/2019 09:21 AM    CO2 23 12/31/2019 09:21 AM    BUN 19 12/31/2019 09:21 AM    Creatinine 1.2 12/31/2019 09:21 AM    Calcium 9.1 12/31/2019 09:21 AM    Anion gap 15.0 12/31/2019 09:21 AM    BUN/Creatinine ratio 13 07/25/2018 02:45 PM    Alk. phosphatase 51 12/31/2019 09:21 AM    Protein, total 7.0 12/31/2019 09:21 AM    Albumin 4.5 12/31/2019 09:21 AM    Globulin 2.5 12/31/2019 09:21 AM    A-G Ratio 1.8 12/31/2019 09:21 AM       No results found for this or any previous visit (from the past 24 hour(s)). images and reports reviewed    Assessment:   Ursula Hernández is a 47 y.o. male is presenting with a picture of symptomatic bilateral inguinal hernias. I Discussed the possibility of incarceration, strangulation, enlargement in size over time, and the risk of emergency surgery in the face of strangulation. I also discussed the use of prosthetic materials (mesh), including the risk of infection.  Also discussed the risk of surgery including recurrence and the possible need for reoperation and removal of mesh if used, possibility of postoperative small bowel injury, obstruction or ileus, and the risks of general anesthetic. I explained to the the patient about the robotic hernia repair procedure. Plan:     1. Schedule for robotic bilateral inguinal hernias repair with placement of meshes. 2. No heavy lifting for 2 weeks after the surgery (More than 15 pounds)  3. Avoid constipation by taking stool softener.     Please call me if you have any questions (cell phone: [phone])     Signed By: Ramon León MD     March 9, 2020

## 2020-03-09 NOTE — PATIENT INSTRUCTIONS
.. 
  [employer] [facility] [address] [facility], [address] 
 
[phone] office main line [phone] main fax PATIENT PRE AND POST OPERATIVE INSTRUCTIONS Before Surgery Instructions:  
1) You must have someone available to drive you to and from your procedure and stay with you for the first 24 hours. 2) It is very important that you have nothing to eat or drink after midnight the night before your surgery. This includes chewing gum or sucking on hard candy. 3) Take only heart, blood pressure and cholesterol medications the morning of surgery with only a sip of water. 4) Please stop taking Plavix 5 - 7 days prior to your surgery. Stop taking all Aspirin or Aspirin containing products 7 days prior to your surgery. Stop taking Advil, Motrin, Aleve, and etc. 3 days prior to your surgery. 5) If you take any diabetic medications please consult with your primary care physician on how to take them on the day of your surgery. 6) Please stop all Herbal products 2 weeks prior to your surgery. 7) Please arrive at the hospital 2 hours prior to your surgery, unless you have been otherwise instructed. Any required labs/urine drug screen/x-rays will be performed on day of surgery. 8) Patients having an operation on their colon will be given a separate instruction sheet on their Bowel Prep. 9) For any pre-operative work up check in at the main entrance to [address] [place] [address], and then go to Patient Registration. These studies are done on a walk in basis they are open from 7:00am to 5:00pm Monday through Friday. 10) Please wash your surgical site the morning of your surgery with antibacterial soap and water. 11) If you are of child bearing age you will have pregnancy test done the morning of your surgery as soon as you arrive. 12) You may be contacted to change your surgery time. At times this is necessary due to equipment or staffing needs. 15) You will be contacted by a hospital preadmission nurse prior to surgery to discuss additional instructions and to review your medications. Please be advised it's your responsibility to notify our office of any changes to your healthcare coverage. Failure to notify our office of any changes to your health care coverage may result in denial of payment by your health insurance for all incurred services and you would be responsible for payment for all incurred services. Surgery Date and Time: March 17th 2020, at 12:45pm 
 
Please check in at [facility], enter through the Emergency Room entrance and go up to the second floor. Please check in by 10:45am the day of your surgery. After Surgery Instructions: You will need to be seen in the office for a follow-up visit 7-14 days after your surgery. Please call after you have had the procedure to make this appointment. Unless otherwise instructed, you may remove your outer bandage and shower 48 hours after your surgery. If you develop a fever greater than 101, have any significant drainage, bleeding, swelling and/or pus of the wound. Please call our office immediately. You may contact Benny Benavides or Tessa Hernandez with any questions at [phone], or [phone].

## 2020-03-09 NOTE — PROGRESS NOTES
Amos Romero is a 47 y.o. male (: 1965) presenting to address:    Chief Complaint   Patient presents with    New Patient     Bilateral inguinal hernia repair       Medication list and allergies have been reviewed with Amos Romero and updated as of today's date. I have gone over all Medical, Surgical and Social History with Amos Romero and updated/added the information accordingly.

## 2020-03-12 NOTE — PERIOP NOTES
PAT - SURGICAL PRE-ADMISSION INSTRUCTIONS    NAME:  Anika Contreras                                                          TODAY'S DATE:  3/12/2020    SURGERY DATE:  3/17/2020                                  SURGERY ARRIVAL TIME:   TBA    1. Do NOT eat or drink anything, including candy or gum, after MIDNIGHT on 03/16 , unless you have specific instructions from your Surgeon or Anesthesia Provider to do so. 2. No smoking on the day of surgery. 3. No alcohol 24 hours prior to the day of surgery. 4. No recreational drugs for one week prior to the day of surgery. 5. Leave all valuables, including money/purse, at home. 6. Remove all jewelry, nail polish, makeup (including mascara); no lotions, powders, deodorant, or perfume/cologne/after shave. 7. Glasses/Contact lenses and Dentures may be worn to the hospital.  They will be removed prior to surgery. 8. Call your doctor if symptoms of a cold or illness develop within 24 ours prior to surgery. 9. AN ADULT MUST DRIVE YOU HOME AFTER OUTPATIENT SURGERY. 10. If you are having an OUTPATIENT procedure, please make arrangements for a responsible adult to be with you for 24 hours after your surgery. 11. If you are admitted to the hospital, you will be assigned to a bed after surgery is complete. Normally a family member will not be able to see you until you are in your assigned bed. 15. Family is encouraged to accompany you to the hospital.  We ask visitors in the treatment area to be limited to ONE person at a time to ensure patient privacy. EXCEPTIONS WILL BE MADE AS NEEDED. 15. Children under 12 are discouraged from entering the treatment area and need to be supervised by an adult when in the waiting room. Special Instructions: Take these medications the morning of surgery with a sip of water:  Amlodipine    Patient Prep:    shower with anti-bacterial soap    These surgical instructions were reviewed with Mallorie Cruz during the PAT phone call. Directions: On the morning of surgery, please go to the [facility]. Enter the building from the [facility] entrance, 1st floor (next to the Emergency Room entrance). Take the elevator to the 2nd floor. Sign in at the Registration Desk.     If you have any questions and/or concerns, please do not hesitate to call:  (Prior to the day of surgery)  [facility] unit:  [phone]  (Day of surgery)  [facility] unit:  [phone]

## 2020-03-13 ENCOUNTER — APPOINTMENT (OUTPATIENT)
Dept: CT IMAGING | Age: 55
End: 2020-03-13
Attending: PHYSICIAN ASSISTANT
Payer: COMMERCIAL

## 2020-03-13 ENCOUNTER — HOSPITAL ENCOUNTER (EMERGENCY)
Age: 55
Discharge: HOME | End: 2020-03-13
Attending: EMERGENCY MEDICINE | Admitting: EMERGENCY MEDICINE
Payer: COMMERCIAL

## 2020-03-13 ENCOUNTER — TELEPHONE (OUTPATIENT)
Dept: SURGERY | Age: 55
End: 2020-03-13

## 2020-03-13 VITALS
TEMPERATURE: 98.8 F | WEIGHT: 195 LBS | RESPIRATION RATE: 16 BRPM | DIASTOLIC BLOOD PRESSURE: 102 MMHG | SYSTOLIC BLOOD PRESSURE: 176 MMHG | OXYGEN SATURATION: 99 % | BODY MASS INDEX: 29.55 KG/M2 | HEIGHT: 68 IN | HEART RATE: 60 BPM

## 2020-03-13 DIAGNOSIS — N40.0 ENLARGED PROSTATE: ICD-10-CM

## 2020-03-13 DIAGNOSIS — K40.20 BILATERAL INGUINAL HERNIA WITHOUT OBSTRUCTION OR GANGRENE, RECURRENCE NOT SPECIFIED: Primary | ICD-10-CM

## 2020-03-13 DIAGNOSIS — N28.1 CYST OF LEFT KIDNEY: ICD-10-CM

## 2020-03-13 LAB
ALBUMIN SERPL-MCNC: 3.9 G/DL (ref 3.4–5)
ALBUMIN/GLOB SERPL: 1.1 {RATIO} (ref 0.8–1.7)
ALP SERPL-CCNC: 58 U/L (ref 45–117)
ALT SERPL-CCNC: 28 U/L (ref 16–61)
ANION GAP SERPL CALC-SCNC: 6 MMOL/L (ref 3–18)
APPEARANCE UR: CLEAR
AST SERPL-CCNC: 18 U/L (ref 10–38)
BACTERIA URNS QL MICRO: NEGATIVE /HPF
BASOPHILS # BLD: 0 K/UL (ref 0–0.1)
BASOPHILS NFR BLD: 0 % (ref 0–2)
BILIRUB SERPL-MCNC: 0.4 MG/DL (ref 0.2–1)
BILIRUB UR QL: NEGATIVE
BUN SERPL-MCNC: 13 MG/DL (ref 7–18)
BUN/CREAT SERPL: 14 (ref 12–20)
CALCIUM SERPL-MCNC: 8.9 MG/DL (ref 8.5–10.1)
CHLORIDE SERPL-SCNC: 109 MMOL/L (ref 100–111)
CO2 SERPL-SCNC: 27 MMOL/L (ref 21–32)
COLOR UR: YELLOW
CREAT SERPL-MCNC: 0.91 MG/DL (ref 0.6–1.3)
DIFFERENTIAL METHOD BLD: NORMAL
EOSINOPHIL # BLD: 0.1 K/UL (ref 0–0.4)
EOSINOPHIL NFR BLD: 2 % (ref 0–5)
EPITH CASTS URNS QL MICRO: NEGATIVE /LPF (ref 0–5)
ERYTHROCYTE [DISTWIDTH] IN BLOOD BY AUTOMATED COUNT: 14.3 % (ref 11.6–14.5)
GLOBULIN SER CALC-MCNC: 3.7 G/DL (ref 2–4)
GLUCOSE SERPL-MCNC: 102 MG/DL (ref 74–99)
GLUCOSE UR STRIP.AUTO-MCNC: NEGATIVE MG/DL
HCT VFR BLD AUTO: 42.7 % (ref 36–48)
HGB BLD-MCNC: 14 G/DL (ref 13–16)
HGB UR QL STRIP: NEGATIVE
KETONES UR QL STRIP.AUTO: NEGATIVE MG/DL
LEUKOCYTE ESTERASE UR QL STRIP.AUTO: ABNORMAL
LYMPHOCYTES # BLD: 1.6 K/UL (ref 0.9–3.6)
LYMPHOCYTES NFR BLD: 27 % (ref 21–52)
MCH RBC QN AUTO: 28.2 PG (ref 24–34)
MCHC RBC AUTO-ENTMCNC: 32.8 G/DL (ref 31–37)
MCV RBC AUTO: 85.9 FL (ref 74–97)
MONOCYTES # BLD: 0.3 K/UL (ref 0.05–1.2)
MONOCYTES NFR BLD: 6 % (ref 3–10)
NEUTS SEG # BLD: 3.8 K/UL (ref 1.8–8)
NEUTS SEG NFR BLD: 65 % (ref 40–73)
NITRITE UR QL STRIP.AUTO: NEGATIVE
PH UR STRIP: 7 [PH] (ref 5–8)
PLATELET # BLD AUTO: 304 K/UL (ref 135–420)
PMV BLD AUTO: 9.3 FL (ref 9.2–11.8)
POTASSIUM SERPL-SCNC: 3.5 MMOL/L (ref 3.5–5.5)
PROT SERPL-MCNC: 7.6 G/DL (ref 6.4–8.2)
PROT UR STRIP-MCNC: NEGATIVE MG/DL
RBC # BLD AUTO: 4.97 M/UL (ref 4.7–5.5)
RBC #/AREA URNS HPF: NEGATIVE /HPF (ref 0–5)
SODIUM SERPL-SCNC: 142 MMOL/L (ref 136–145)
SP GR UR REFRACTOMETRY: 1.02 (ref 1–1.03)
UROBILINOGEN UR QL STRIP.AUTO: 1 EU/DL (ref 0.2–1)
WBC # BLD AUTO: 5.8 K/UL (ref 4.6–13.2)
WBC URNS QL MICRO: NEGATIVE /HPF (ref 0–4)

## 2020-03-13 PROCEDURE — 74177 CT ABD & PELVIS W/CONTRAST: CPT

## 2020-03-13 PROCEDURE — 81001 URINALYSIS AUTO W/SCOPE: CPT

## 2020-03-13 PROCEDURE — 85025 COMPLETE CBC W/AUTO DIFF WBC: CPT

## 2020-03-13 PROCEDURE — 99283 EMERGENCY DEPT VISIT LOW MDM: CPT

## 2020-03-13 PROCEDURE — 80053 COMPREHEN METABOLIC PANEL: CPT

## 2020-03-13 PROCEDURE — 74011636320 HC RX REV CODE- 636/320: Performed by: EMERGENCY MEDICINE

## 2020-03-13 RX ORDER — TRAMADOL HYDROCHLORIDE 50 MG/1
50 TABLET ORAL
Qty: 12 TAB | Refills: 0 | Status: SHIPPED | OUTPATIENT
Start: 2020-03-13 | End: 2020-03-17

## 2020-03-13 RX ADMIN — IOPAMIDOL 94 ML: 612 INJECTION, SOLUTION INTRAVENOUS at 11:42

## 2020-03-13 NOTE — ED PROVIDER NOTES
EMERGENCY DEPARTMENT HISTORY AND PHYSICAL EXAM    12:05 PM      Date: 3/13/2020  Patient Name: Chantel Roman    History of Presenting Illness     Chief Complaint   Patient presents with    Groin Pain         History Provided By: Patient    Chief Complaint: left inguinal hernia pain  Duration: 3 Weeks, worse in the last 2 days  Timing:  Acute  Location:   Quality: Aching  Severity: Moderate  Modifying Factors: none  Associated Symptoms: denies any other associated signs or symptoms      Additional History (Context):Mikal Hankins is a 47 y.o. male with a pertinent history of hypertension, hyperlipidemia, syncope who presents to the emergency department for evaluation of worsening left inguinal hernia pain for the past 2 days. Patient states pain initially started 3 weeks ago. He states that hernia is not hard, just more painful. Patient has a surgery scheduled on Tuesday for bilateral inguinal hernia repair by Dr. Steve Devlin. Patient denies any fevers, chills, nausea, vomiting, diarrhea, constipation, abdominal pain, back pain, testicular pain, dysuria, penile discharge, or any other complaints. Patient did not take his blood pressure medication today. PCP:  Karen Kong MD      Current Outpatient Medications   Medication Sig Dispense Refill    traMADoL (Ultram) 50 mg tablet Take 1 Tab by mouth every eight (8) hours as needed for Pain for up to 4 days. Max Daily Amount: 150 mg. 12 Tab 0    lisinopril (PRINIVIL, ZESTRIL) 40 mg tablet Take 1 Tab by mouth daily. 90 Tab 3    amLODIPine (NORVASC) 10 mg tablet Take 1 Tab by mouth daily. Take 1 Tab by Mouth Once a Day. 90 Tab 1    atorvastatin (LIPITOR) 40 mg tablet Take 1 Tab by mouth daily. Take 1 Tab by Mouth Every Night at Bedtime. 90 Tab 1    aspirin 81 mg chewable tablet Take 1 Tab by mouth daily. Take 81 mg by Mouth Once a Day.  80 Tab 4       Past History     Past Medical History:  Past Medical History:   Diagnosis Date    Hypertension     Other ill-defined conditions(799.89)     cardiac monitoring- history of syncope    Pure hypercholesterolemia 12/1/2011    Syncope     Loop Recorder       Past Surgical History:  Past Surgical History:   Procedure Laterality Date    HX OTHER SURGICAL      cardiac monitoring device implant       Family History:  Family History   Problem Relation Age of Onset    Hypertension Mother     Heart Disease Mother     Cancer Mother     Cancer Father        Social History:  Social History     Tobacco Use    Smoking status: Never Smoker    Smokeless tobacco: Never Used   Substance Use Topics    Alcohol use: Yes     Comment: occ    Drug use: No       Allergies:  No Known Allergies      Review of Systems     Review of Systems   Constitutional: Negative for chills and fever. HENT: Negative for congestion, rhinorrhea and sore throat. Respiratory: Negative for cough and shortness of breath. Cardiovascular: Negative for chest pain. Gastrointestinal: Negative for abdominal pain, blood in stool, constipation, diarrhea, nausea and vomiting. Genitourinary: Negative for dysuria, frequency and hematuria. Musculoskeletal: Negative for back pain and myalgias. Skin: Negative for rash and wound. Neurological: Negative for dizziness and headaches. All other systems reviewed and are negative. Physical Exam     Visit Vitals  BP (!) 183/106 (BP 1 Location: Right arm, BP Patient Position: Sitting)   Pulse 60   Temp 98.8 °F (37.1 °C)   Resp 16   Ht 5' 8\" (1.727 m)   Wt 88.5 kg (195 lb)   SpO2 97%   BMI 29.65 kg/m²       Physical Exam  Vitals signs and nursing note reviewed. Constitutional:       General: He is not in acute distress. Appearance: He is well-developed. He is not diaphoretic. HENT:      Head: Normocephalic and atraumatic. Eyes:      Conjunctiva/sclera: Conjunctivae normal.   Neck:      Musculoskeletal: Normal range of motion and neck supple.    Cardiovascular:      Rate and Rhythm: Normal rate and regular rhythm. Heart sounds: Normal heart sounds. Pulmonary:      Effort: Pulmonary effort is normal. No respiratory distress. Breath sounds: Normal breath sounds. Chest:      Chest wall: No tenderness. Abdominal:      General: Bowel sounds are normal. There is no distension. Palpations: Abdomen is soft. Tenderness: There is no abdominal tenderness. There is no guarding or rebound. Comments: Abdomen soft, nontender   Genitourinary:     Comments: Soft, reducible bulges noted to bilateral inguinal regions. Left inguinal hernia is tender to palpation. Unremarkable exam of testes and scrotum without tenderness, swelling, or rash. Unremarkable exam of penis. Absent cremasteric reflexes bilaterally. Musculoskeletal: Normal range of motion. General: No deformity. Skin:     General: Skin is warm and dry. Neurological:      Mental Status: He is alert and oriented to person, place, and time.          Diagnostic Study Results     Labs -  Recent Results (from the past 12 hour(s))   URINALYSIS W/ RFLX MICROSCOPIC    Collection Time: 03/13/20  9:46 AM   Result Value Ref Range    Color YELLOW      Appearance CLEAR      Specific gravity 1.022 1.005 - 1.030      pH (UA) 7.0 5.0 - 8.0      Protein NEGATIVE  NEG mg/dL    Glucose NEGATIVE  NEG mg/dL    Ketone NEGATIVE  NEG mg/dL    Bilirubin NEGATIVE  NEG      Blood NEGATIVE  NEG      Urobilinogen 1.0 0.2 - 1.0 EU/dL    Nitrites NEGATIVE  NEG      Leukocyte Esterase TRACE (A) NEG     URINE MICROSCOPIC ONLY    Collection Time: 03/13/20  9:46 AM   Result Value Ref Range    WBC NEGATIVE  0 - 4 /hpf    RBC NEGATIVE  0 - 5 /hpf    Epithelial cells NEGATIVE  0 - 5 /lpf    Bacteria NEGATIVE  NEG /hpf   CBC WITH AUTOMATED DIFF    Collection Time: 03/13/20 10:05 AM   Result Value Ref Range    WBC 5.8 4.6 - 13.2 K/uL    RBC 4.97 4.70 - 5.50 M/uL    HGB 14.0 13.0 - 16.0 g/dL    HCT 42.7 36.0 - 48.0 %    MCV 85.9 74.0 - 97.0 FL    MCH 28.2 24.0 - 34.0 PG    MCHC 32.8 31.0 - 37.0 g/dL    RDW 14.3 11.6 - 14.5 %    PLATELET 598 375 - 835 K/uL    MPV 9.3 9.2 - 11.8 FL    NEUTROPHILS 65 40 - 73 %    LYMPHOCYTES 27 21 - 52 %    MONOCYTES 6 3 - 10 %    EOSINOPHILS 2 0 - 5 %    BASOPHILS 0 0 - 2 %    ABS. NEUTROPHILS 3.8 1.8 - 8.0 K/UL    ABS. LYMPHOCYTES 1.6 0.9 - 3.6 K/UL    ABS. MONOCYTES 0.3 0.05 - 1.2 K/UL    ABS. EOSINOPHILS 0.1 0.0 - 0.4 K/UL    ABS. BASOPHILS 0.0 0.0 - 0.1 K/UL    DF AUTOMATED     METABOLIC PANEL, COMPREHENSIVE    Collection Time: 03/13/20 10:05 AM   Result Value Ref Range    Sodium 142 136 - 145 mmol/L    Potassium 3.5 3.5 - 5.5 mmol/L    Chloride 109 100 - 111 mmol/L    CO2 27 21 - 32 mmol/L    Anion gap 6 3.0 - 18 mmol/L    Glucose 102 (H) 74 - 99 mg/dL    BUN 13 7.0 - 18 MG/DL    Creatinine 0.91 0.6 - 1.3 MG/DL    BUN/Creatinine ratio 14 12 - 20      GFR est AA >60 >60 ml/min/1.73m2    GFR est non-AA >60 >60 ml/min/1.73m2    Calcium 8.9 8.5 - 10.1 MG/DL    Bilirubin, total 0.4 0.2 - 1.0 MG/DL    ALT (SGPT) 28 16 - 61 U/L    AST (SGOT) 18 10 - 38 U/L    Alk. phosphatase 58 45 - 117 U/L    Protein, total 7.6 6.4 - 8.2 g/dL    Albumin 3.9 3.4 - 5.0 g/dL    Globulin 3.7 2.0 - 4.0 g/dL    A-G Ratio 1.1 0.8 - 1.7         Radiologic Studies -   Ct Abd Pelv W Cont    Result Date: 3/13/2020  CT of the abdomen and pelvis with contrast INDICATION: Left inguinal pain. COMPARISON: CT 02/10/2020. TECHNIQUE: Axial CT imaging of the abdomen and pelvis was performed with intravenous contrast. No oral contrast was given which decreases sensitivity for bowel pathology or abscess. Multiplanar reformats were generated. One or more dose reduction techniques were used on this CT: automated exposure control, adjustment of the mAs and/or kVp according to patient size, and iterative reconstruction techniques. The specific techniques used on this CT exam have been documented in the patient's electronic medical record.  Digital Imaging and Communications in Medicine (DICOM) format image data are available to nonaffiliated external healthcare facilities or entities on a secure, media free, reciprocally searchable basis with patient authorization for at least a 12-month period after this study. _______________ FINDINGS: Fat-containing bilateral inguinal hernias. Fat-containing umbilical hernia. LOWER CHEST: Unremarkable. Mild cardiomegaly. LIVER, BILIARY: Homogeneous liver with stable tiny hypodensity at the tip of the right hepatic lobe, indeterminate but probable cyst or hemangioma. No biliary dilation. Gallbladder is unremarkable. PANCREAS: Normal. SPLEEN: Normal. ADRENALS: Normal. KIDNEYS: Nonobstructing left lower pole urolithiasis measuring up to 3 mm. Small cyst or angiomyolipoma upper pole right kidney and indeterminant 2 cm hypodense partially exophytic lesion upper pole left kidney, stable from CT 04/26/2017. VASCULATURE: Minimal atherosclerosis. . Minimal atherosclerosis. GASTROINTESTINAL TRACT: Diverticulosis. No bowel dilation or wall thickening. LYMPH NODES: No enlarged lymph nodes. PELVIC ORGANS: Prostate gland is slightly enlarged. Urinary bladder is decompressed. BONES: No fracture or suspicious bone lesion. OTHER: None. _______________     IMPRESSION: 1. Fat-containing bilateral inguinal hernias. No evidence for bowel herniation, obstruction, or incarceration. Diverticulosis. 2. Nonobstructing left lower pole urolithiasis. Indeterminant 2 cm exophytic lesion left kidney, stable and suggestive of proteinaceous cyst but solid neoplasm cannot be entirely excluded. Correlation with ultrasound would be helpful to differentiate solid versus cystic. 3. Enlarged prostate gland. Medical Decision Making   I am the first provider for this patient. I reviewed the vital signs, available nursing notes, past medical history, past surgical history, family history and social history. Vital Signs-Reviewed the patient's vital signs.     Pulse Oximetry Analysis -  97% on room air (Interpretation)    Records Reviewed: Nursing Notes and Old Medical Records (Time of Review: 12:05 PM)    ED Course: Progress Notes, Reevaluation, and Consults:    Provider Notes (Medical Decision Making):   differential diagnosis: Incarcerated inguinal hernia, diverticulitis, UTI, obstructive urolith    Plan: Patient presents ambulatory in no significant distress with elevated blood pressure and otherwise normal vitals. Patient admits he did not take his blood pressure medication this morning. He is not experiencing chest pain, shortness of breath, headache, dizziness. Examination of abdomen is nontender, nondistended. No peritoneal signs. Bilateral, small, soft inguinal hernias noted. Left inguinal hernia is tender to palpation. Unremarkable CBC, CMP, and UA. CT does not demonstrate incarcerated hernia or bowel obstruction, but does note incidental exophytic lesion left kidney and enlarged prostate. Pt informed of all CT findings. Will discharge home with few ultram to help patient until his surgery on Tuesday. At this time, patient is stable and appropriate for discharge home. Patient demonstrates understanding of current diagnoses and is in agreement with the treatment plan. They are advised that while the likelihood of serious underlying condition is low at this point given the evaluation performed today, we cannot fully rule it out. They are advised to immediately return with any new symptoms or worsening of current condition. All questions have been answered. Patient is given educational material regarding their diagnoses, including danger symptoms and when to return to the ED. Diagnosis     Clinical Impression:   1. Bilateral inguinal hernia without obstruction or gangrene, recurrence not specified    2. Cyst of left kidney    3.  Enlarged prostate        Disposition: [facility]    Follow-up Information     Follow up With Specialties Details Why Contact Info    Celia De Paz MD Surgery Call For follow-up [id number] [id number] [phone]      [facility] EMERGENCY DEPT Emergency Medicine Go to As needed, If symptoms worsen [phone] [facility] [address].  [phone]           Patient's Medications   Start Taking    TRAMADOL (ULTRAM) 50 MG TABLET    Take 1 Tab by mouth every eight (8) hours as needed for Pain for up to 4 days. Max Daily Amount: 150 mg. Continue Taking    AMLODIPINE (NORVASC) 10 MG TABLET    Take 1 Tab by mouth daily. Take 1 Tab by Mouth Once a Day. ASPIRIN 81 MG CHEWABLE TABLET    Take 1 Tab by mouth daily. Take 81 mg by Mouth Once a Day. ATORVASTATIN (LIPITOR) 40 MG TABLET    Take 1 Tab by mouth daily. Take 1 Tab by Mouth Every Night at Bedtime. LISINOPRIL (PRINIVIL, ZESTRIL) 40 MG TABLET    Take 1 Tab by mouth daily. These Medications have changed    No medications on file   Stop Taking    No medications on file     _______________________________    This note was dictated utilizing voice recognition software which may lead to typographical errors. I apologize in advance if the situation occurs. If questions arise please do not hesitate to contact me or call our department.   Heather Montano PA-C

## 2020-03-13 NOTE — ED TRIAGE NOTES
Pt reports having a hernia in left groin, supposed to get it fixed on Tuesday of next week. Started hurting last night and not getting better.

## 2020-03-13 NOTE — DISCHARGE INSTRUCTIONS
Patient Education     Please return immediately to the Emergency Room for re-evaluation if you are not improving, develop any new symptoms, or develop worsening of current symptoms! If you have been prescribed a medication and are unable to take this medication for any reason, please return to the Emergency Department for further evaluation! If you have been referred for follow-up to a specialist, but are unable to follow-up and your symptoms are either not improving or are worsening, please return to the Emergency Department for further evaluation! Inguinal Hernia: Care Instructions  Your Care Instructions    An inguinal hernia occurs when tissue bulges through a weak spot in your groin area. You may see or feel a tender bulge in the groin or scrotum. You may also have pain, pressure or burning, or a feeling that something has \"given way. \"  Hernias are caused by a weakness in the belly wall. The bulge or discomfort may occur after heavy lifting, straining, or coughing. Hernias do not heal on their own, and they tend to get worse over time. If your hernia does not bother you, you most likely can wait to have surgery. Your hernia may get worse, but it may not. In some cases, hernias that are small and painless may never need to be repaired. Follow-up care is a key part of your treatment and safety. Be sure to make and go to all appointments, and call your doctor if you are having problems. It's also a good idea to know your test results and keep a list of the medicines you take. How can you care for yourself at home? · Take pain medicines exactly as directed. ? If the doctor gave you a prescription medicine for pain, take it as prescribed. ? If you are not taking a prescription pain medicine, ask your doctor if you can take an over-the-counter medicine. · Use proper lifting techniques, and avoid heavy lifting if you can.  To lift things more safely, bend your knees and let your arms and legs do the work. Keep your back straight, and do not bend over at the waist. Keep the load as close to your body as you can. Move your feet instead of turning or twisting your body. · Lose weight if you are overweight. · Include fruits, vegetables, legumes, and whole grains in your diet each day. These foods are high in fiber and will make it easier to avoid straining during bowel movements. · Do not smoke. Smoking can cause coughing, which can cause your hernia to bulge. If you need help quitting, talk to your doctor about stop-smoking programs and medicines. These can increase your chances of quitting for good. When should you call for help? Call your doctor now or seek immediate medical care if:    · You have new or worse belly pain.     · You are vomiting.     · You cannot pass stools or gas.     · You cannot push the hernia back into place with gentle pressure when you are lying down.     · The area over the hernia turns red or becomes tender.    Watch closely for changes in your health, and be sure to contact your doctor if you have any problems. Where can you learn more? Go to http://ad-jeremías.info/  Enter G505 in the search box to learn more about \"Inguinal Hernia: Care Instructions. \"  Current as of: August 11, 2019Content Version: 12.4  © 7350-1628 [employer]. Care instructions adapted under license by [employer] (which disclaims liability or warranty for this information). If you have questions about a medical condition or this instruction, always ask your healthcare professional. [place] [address] any warranty or liability for your use of this information.

## 2020-03-13 NOTE — TELEPHONE ENCOUNTER
Pt due to have inguinal hernia repair next Tuesday 3/17/2020. Pt called today saying he was in \"severe pain\". He reports that this pain is the worst that its ever been and is an 8 on a scale of 1-10. He reports that he feel that he will be going in to the ER for further evaluation.

## 2020-03-16 ENCOUNTER — ANESTHESIA EVENT (OUTPATIENT)
Dept: SURGERY | Age: 55
End: 2020-03-16
Payer: COMMERCIAL

## 2020-03-17 ENCOUNTER — HOSPITAL ENCOUNTER (OUTPATIENT)
Age: 55
Setting detail: OUTPATIENT SURGERY
Discharge: HOME | End: 2020-03-17
Attending: SURGERY | Admitting: SURGERY
Payer: COMMERCIAL

## 2020-03-17 ENCOUNTER — ANESTHESIA (OUTPATIENT)
Dept: SURGERY | Age: 55
End: 2020-03-17
Payer: COMMERCIAL

## 2020-03-17 VITALS
TEMPERATURE: 97.2 F | HEART RATE: 56 BPM | SYSTOLIC BLOOD PRESSURE: 127 MMHG | DIASTOLIC BLOOD PRESSURE: 82 MMHG | OXYGEN SATURATION: 97 % | RESPIRATION RATE: 16 BRPM | HEIGHT: 68 IN | WEIGHT: 195 LBS | BODY MASS INDEX: 29.55 KG/M2

## 2020-03-17 DIAGNOSIS — Z98.890 S/P HERNIA REPAIR: Primary | ICD-10-CM

## 2020-03-17 DIAGNOSIS — Z87.19 S/P HERNIA REPAIR: Primary | ICD-10-CM

## 2020-03-17 LAB
ATRIAL RATE: 56 BPM
CALCULATED P AXIS, ECG09: 44 DEGREES
CALCULATED R AXIS, ECG10: -14 DEGREES
CALCULATED T AXIS, ECG11: 5 DEGREES
DIAGNOSIS, 93000: NORMAL
P-R INTERVAL, ECG05: 186 MS
Q-T INTERVAL, ECG07: 460 MS
QRS DURATION, ECG06: 100 MS
QTC CALCULATION (BEZET), ECG08: 443 MS
VENTRICULAR RATE, ECG03: 56 BPM

## 2020-03-17 PROCEDURE — 77030010507 HC ADH SKN DERMBND J&J -B: Performed by: SURGERY

## 2020-03-17 PROCEDURE — 77030031139 HC SUT VCRL2 J&J -A: Performed by: SURGERY

## 2020-03-17 PROCEDURE — 77030018842 HC SOL IRR SOD CL 9% BAXT -A: Performed by: SURGERY

## 2020-03-17 PROCEDURE — 76210000017 HC OR PH I REC 1.5 TO 2 HR: Performed by: SURGERY

## 2020-03-17 PROCEDURE — 74011250636 HC RX REV CODE- 250/636: Performed by: NURSE ANESTHETIST, CERTIFIED REGISTERED

## 2020-03-17 PROCEDURE — 74011000250 HC RX REV CODE- 250: Performed by: SURGERY

## 2020-03-17 PROCEDURE — 77030035277 HC OBTRTR BLDELSS DISP INTU -B: Performed by: SURGERY

## 2020-03-17 PROCEDURE — 76060000032 HC ANESTHESIA 0.5 TO 1 HR: Performed by: SURGERY

## 2020-03-17 PROCEDURE — 77030022704 HC SUT VLOC COVD -B: Performed by: SURGERY

## 2020-03-17 PROCEDURE — 74011250636 HC RX REV CODE- 250/636: Performed by: SURGERY

## 2020-03-17 PROCEDURE — 76010000933 HC OR TIME 0.5 TO 1HR INTENSV - TIER 2: Performed by: SURGERY

## 2020-03-17 PROCEDURE — 77030002933 HC SUT MCRYL J&J -A: Performed by: SURGERY

## 2020-03-17 PROCEDURE — 74011000250 HC RX REV CODE- 250: Performed by: NURSE ANESTHETIST, CERTIFIED REGISTERED

## 2020-03-17 PROCEDURE — 93005 ELECTROCARDIOGRAM TRACING: CPT

## 2020-03-17 PROCEDURE — 77030020703 HC SEAL CANN DISP INTU -B: Performed by: SURGERY

## 2020-03-17 PROCEDURE — 76210000020 HC REC RM PH II FIRST 0.5 HR: Performed by: SURGERY

## 2020-03-17 PROCEDURE — C1781 MESH (IMPLANTABLE): HCPCS | Performed by: SURGERY

## 2020-03-17 PROCEDURE — 74011250637 HC RX REV CODE- 250/637: Performed by: NURSE ANESTHETIST, CERTIFIED REGISTERED

## 2020-03-17 PROCEDURE — 77030032490 HC SLV COMPR SCD KNE COVD -B: Performed by: SURGERY

## 2020-03-17 DEVICE — LAPAROSCOPIC SELF-FIXATING MESH POLYESTER WITH POLYLACTIC ACID GRIPS AND COLLAGEN FILM
Type: IMPLANTABLE DEVICE | Site: INGUINAL | Status: FUNCTIONAL
Brand: PROGRIP

## 2020-03-17 RX ORDER — NEOSTIGMINE METHYLSULFATE 1 MG/ML
INJECTION, SOLUTION INTRAVENOUS AS NEEDED
Status: DISCONTINUED | OUTPATIENT
Start: 2020-03-17 | End: 2020-03-17 | Stop reason: HOSPADM

## 2020-03-17 RX ORDER — LIDOCAINE HYDROCHLORIDE 10 MG/ML
0.1 INJECTION, SOLUTION EPIDURAL; INFILTRATION; INTRACAUDAL; PERINEURAL AS NEEDED
Status: DISCONTINUED | OUTPATIENT
Start: 2020-03-17 | End: 2020-03-17 | Stop reason: HOSPADM

## 2020-03-17 RX ORDER — KETOROLAC TROMETHAMINE 15 MG/ML
INJECTION, SOLUTION INTRAMUSCULAR; INTRAVENOUS AS NEEDED
Status: DISCONTINUED | OUTPATIENT
Start: 2020-03-17 | End: 2020-03-17 | Stop reason: HOSPADM

## 2020-03-17 RX ORDER — SODIUM CHLORIDE, SODIUM LACTATE, POTASSIUM CHLORIDE, CALCIUM CHLORIDE 600; 310; 30; 20 MG/100ML; MG/100ML; MG/100ML; MG/100ML
25 INJECTION, SOLUTION INTRAVENOUS CONTINUOUS
Status: DISCONTINUED | OUTPATIENT
Start: 2020-03-17 | End: 2020-03-17 | Stop reason: HOSPADM

## 2020-03-17 RX ORDER — ONDANSETRON 2 MG/ML
4 INJECTION INTRAMUSCULAR; INTRAVENOUS ONCE
Status: DISCONTINUED | OUTPATIENT
Start: 2020-03-17 | End: 2020-03-17 | Stop reason: HOSPADM

## 2020-03-17 RX ORDER — SUCCINYLCHOLINE CHLORIDE 100 MG/5ML
SYRINGE (ML) INTRAVENOUS AS NEEDED
Status: DISCONTINUED | OUTPATIENT
Start: 2020-03-17 | End: 2020-03-17 | Stop reason: HOSPADM

## 2020-03-17 RX ORDER — SODIUM CHLORIDE, SODIUM LACTATE, POTASSIUM CHLORIDE, CALCIUM CHLORIDE 600; 310; 30; 20 MG/100ML; MG/100ML; MG/100ML; MG/100ML
50 INJECTION, SOLUTION INTRAVENOUS CONTINUOUS
Status: DISCONTINUED | OUTPATIENT
Start: 2020-03-17 | End: 2020-03-17 | Stop reason: HOSPADM

## 2020-03-17 RX ORDER — OXYCODONE AND ACETAMINOPHEN 5; 325 MG/1; MG/1
1 TABLET ORAL
Qty: 12 TAB | Refills: 0 | Status: SHIPPED | OUTPATIENT
Start: 2020-03-17 | End: 2020-03-20

## 2020-03-17 RX ORDER — FENTANYL CITRATE 50 UG/ML
INJECTION, SOLUTION INTRAMUSCULAR; INTRAVENOUS AS NEEDED
Status: DISCONTINUED | OUTPATIENT
Start: 2020-03-17 | End: 2020-03-17 | Stop reason: HOSPADM

## 2020-03-17 RX ORDER — LIDOCAINE HYDROCHLORIDE 20 MG/ML
INJECTION, SOLUTION EPIDURAL; INFILTRATION; INTRACAUDAL; PERINEURAL AS NEEDED
Status: DISCONTINUED | OUTPATIENT
Start: 2020-03-17 | End: 2020-03-17 | Stop reason: HOSPADM

## 2020-03-17 RX ORDER — MIDAZOLAM HYDROCHLORIDE 1 MG/ML
INJECTION, SOLUTION INTRAMUSCULAR; INTRAVENOUS AS NEEDED
Status: DISCONTINUED | OUTPATIENT
Start: 2020-03-17 | End: 2020-03-17 | Stop reason: HOSPADM

## 2020-03-17 RX ORDER — HYDROCODONE BITARTRATE AND ACETAMINOPHEN 5; 325 MG/1; MG/1
1 TABLET ORAL ONCE
Status: COMPLETED | OUTPATIENT
Start: 2020-03-17 | End: 2020-03-17

## 2020-03-17 RX ORDER — VECURONIUM BROMIDE FOR INJECTION 1 MG/ML
INJECTION, POWDER, LYOPHILIZED, FOR SOLUTION INTRAVENOUS AS NEEDED
Status: DISCONTINUED | OUTPATIENT
Start: 2020-03-17 | End: 2020-03-17 | Stop reason: HOSPADM

## 2020-03-17 RX ORDER — CEFAZOLIN SODIUM 2 G/50ML
2 SOLUTION INTRAVENOUS
Status: COMPLETED | OUTPATIENT
Start: 2020-03-17 | End: 2020-03-17

## 2020-03-17 RX ORDER — SODIUM CHLORIDE 0.9 % (FLUSH) 0.9 %
5-40 SYRINGE (ML) INJECTION AS NEEDED
Status: DISCONTINUED | OUTPATIENT
Start: 2020-03-17 | End: 2020-03-17 | Stop reason: HOSPADM

## 2020-03-17 RX ORDER — ONDANSETRON 2 MG/ML
INJECTION INTRAMUSCULAR; INTRAVENOUS AS NEEDED
Status: DISCONTINUED | OUTPATIENT
Start: 2020-03-17 | End: 2020-03-17 | Stop reason: HOSPADM

## 2020-03-17 RX ORDER — HYDROMORPHONE HYDROCHLORIDE 2 MG/ML
0.5 INJECTION, SOLUTION INTRAMUSCULAR; INTRAVENOUS; SUBCUTANEOUS
Status: DISCONTINUED | OUTPATIENT
Start: 2020-03-17 | End: 2020-03-17 | Stop reason: HOSPADM

## 2020-03-17 RX ORDER — GLYCOPYRROLATE 0.2 MG/ML
INJECTION INTRAMUSCULAR; INTRAVENOUS AS NEEDED
Status: DISCONTINUED | OUTPATIENT
Start: 2020-03-17 | End: 2020-03-17 | Stop reason: HOSPADM

## 2020-03-17 RX ORDER — SODIUM CHLORIDE 0.9 % (FLUSH) 0.9 %
5-40 SYRINGE (ML) INJECTION EVERY 8 HOURS
Status: DISCONTINUED | OUTPATIENT
Start: 2020-03-17 | End: 2020-03-17 | Stop reason: HOSPADM

## 2020-03-17 RX ORDER — PROPOFOL 10 MG/ML
INJECTION, EMULSION INTRAVENOUS AS NEEDED
Status: DISCONTINUED | OUTPATIENT
Start: 2020-03-17 | End: 2020-03-17 | Stop reason: HOSPADM

## 2020-03-17 RX ORDER — FENTANYL CITRATE 50 UG/ML
50 INJECTION, SOLUTION INTRAMUSCULAR; INTRAVENOUS AS NEEDED
Status: DISCONTINUED | OUTPATIENT
Start: 2020-03-17 | End: 2020-03-17 | Stop reason: HOSPADM

## 2020-03-17 RX ADMIN — GLYCOPYRROLATE 0.2 MG: 0.2 INJECTION INTRAMUSCULAR; INTRAVENOUS at 11:16

## 2020-03-17 RX ADMIN — HYDROCODONE BITARTRATE AND ACETAMINOPHEN 1 TABLET: 5; 325 TABLET ORAL at 13:23

## 2020-03-17 RX ADMIN — GLYCOPYRROLATE 0.4 MG: 0.2 INJECTION INTRAMUSCULAR; INTRAVENOUS at 11:54

## 2020-03-17 RX ADMIN — FENTANYL CITRATE 100 MCG: 50 INJECTION, SOLUTION INTRAMUSCULAR; INTRAVENOUS at 11:16

## 2020-03-17 RX ADMIN — FAMOTIDINE 20 MG: 10 INJECTION INTRAVENOUS at 08:53

## 2020-03-17 RX ADMIN — ONDANSETRON 4 MG: 2 SOLUTION INTRAMUSCULAR; INTRAVENOUS at 11:52

## 2020-03-17 RX ADMIN — CEFAZOLIN SODIUM 2 G: 2 SOLUTION INTRAVENOUS at 11:18

## 2020-03-17 RX ADMIN — Medication 3 MG: at 11:54

## 2020-03-17 RX ADMIN — SODIUM CHLORIDE, SODIUM LACTATE, POTASSIUM CHLORIDE, AND CALCIUM CHLORIDE 25 ML/HR: 600; 310; 30; 20 INJECTION, SOLUTION INTRAVENOUS at 08:51

## 2020-03-17 RX ADMIN — KETOROLAC TROMETHAMINE 15 MG: 15 INJECTION, SOLUTION INTRAMUSCULAR; INTRAVENOUS at 11:54

## 2020-03-17 RX ADMIN — Medication 100 MG: at 11:17

## 2020-03-17 RX ADMIN — DEXMEDETOMIDINE: 100 INJECTION, SOLUTION, CONCENTRATE INTRAVENOUS at 11:25

## 2020-03-17 RX ADMIN — DEXMEDETOMIDINE: 100 INJECTION, SOLUTION, CONCENTRATE INTRAVENOUS at 11:26

## 2020-03-17 RX ADMIN — VECURONIUM BROMIDE FOR INJECTION 3 MG: 1 INJECTION, POWDER, LYOPHILIZED, FOR SOLUTION INTRAVENOUS at 11:19

## 2020-03-17 RX ADMIN — PROPOFOL 200 MG: 10 INJECTION, EMULSION INTRAVENOUS at 11:16

## 2020-03-17 RX ADMIN — LIDOCAINE HYDROCHLORIDE 60 MG: 20 INJECTION, SOLUTION INTRAVENOUS at 11:16

## 2020-03-17 RX ADMIN — SODIUM CHLORIDE, SODIUM LACTATE, POTASSIUM CHLORIDE, AND CALCIUM CHLORIDE: 600; 310; 30; 20 INJECTION, SOLUTION INTRAVENOUS at 11:54

## 2020-03-17 RX ADMIN — MIDAZOLAM 2 MG: 1 INJECTION INTRAMUSCULAR; INTRAVENOUS at 11:10

## 2020-03-17 NOTE — DISCHARGE INSTRUCTIONS
Discharge Instructions Following Surgery    Patient: Aishwarya Pearson MRN: [id number]  SSN: [id number]    YOB: 1965  Age: 47 y.o. Sex: male      Activity  · As tolerated, walking encourage, stairs are okay. · Avoid strenuous activities - no lifting anything heavier than 15 pounds till seen in the clinic. · You may shower at home after 24 hours. Diet  · Regular diet after nausea from the anesthetic has passed. Pain  · Take pain medication as directed by your doctor. · Call your doctor if pain is NOT relieved by medication. Wound and Dressing Care  · There is glue on the wounds. No need for any dressing care. · Apply ice packs to the area of the surgery for the first 1 to 2 days  · Apply warm compresses after 2 days for pain relieve if needed    After Anesthesia  · For the first 24 hours: DO NOT Drive, Drink alcoholic beverages, or Make important decisions. · Be aware of dizziness following anesthesia and while taking pain medication. Call your doctor if  · Excessive bleeding that does not stop after holding mild pressure over the area. · Temperature of 101 degrees F or above. · Redness,excessive swelling or bruising, and/or green or yellow, smelly discharge from incision. · If nausea and vomiting continues. Appointment date/time Follow-Up Phone Calls    · Call the office at [phone] to make your follow-up appointment in 2 weeks after the surgery (if not already set up) . Dr. Olsen Minus cell phone number is [phone]. Please call me if you have any concerns or questions.      Patient armband removed and shredded    DISCHARGE SUMMARY from Nurse    PATIENT INSTRUCTIONS:    After general anesthesia or intravenous sedation, for 24 hours or while taking prescription Narcotics:  · Limit your activities  · Do not drive and operate hazardous machinery  · Do not make important personal or business decisions  · Do  not drink alcoholic beverages  · If you have not urinated within 8 hours after discharge, please contact your surgeon on call. Report the following to your surgeon:  · Excessive pain, swelling, redness or odor of or around the surgical area  · Temperature over 100.5  · Nausea and vomiting lasting longer than 4 hours or if unable to take medications  · Any signs of decreased circulation or nerve impairment to extremity: change in color, persistent  numbness, tingling, coldness or increase pain  · Any questions    What to do at Home:      These are general instructions for a healthy lifestyle:    No smoking/ No tobacco products/ Avoid exposure to second hand smoke  Surgeon General's Warning:  Quitting smoking now greatly reduces serious risk to your health. Obesity, smoking, and sedentary lifestyle greatly increases your risk for illness    A healthy diet, regular physical exercise & weight monitoring are important for maintaining a healthy lifestyle    You may be retaining fluid if you have a history of heart failure or if you experience any of the following symptoms:  Weight gain of 3 pounds or more overnight or 5 pounds in a week, increased swelling in our hands or feet or shortness of breath while lying flat in bed. Please call your doctor as soon as you notice any of these symptoms; do not wait until your next office visit. The discharge information has been reviewed with the patient. The patient verbalized understanding. Discharge medications reviewed with the patient and appropriate educational materials and side effects teaching were provided.   ___________________________________________________________________________________________________________________________________

## 2020-03-17 NOTE — BRIEF OP NOTE
BRIEF OPERATIVE NOTE    Date of Procedure: 3/17/2020   Preoperative Diagnosis: right inguinal hernia k40.90 left inguinal hernia  k40.90  Postoperative Diagnosis: right inguinal hernia k40.90 left inguinal hernia  k40.90    Procedure(s):  Kira Bent bilateral inguinal hernia repair with mesh ROBOTIC ASSISTED  Surgeon(s) and Role:     * Celia De Paz MD - Primary  Surgical Staff:  Circ-1: Ada Lopez RN  Scrub Tech-1: Mari Maldonado  Surg Asst-1: Camille Parmar  Event Time In   Incision Start 1122   Incision Close      Anesthesia: General   Estimated Blood Loss: Minimal.  Specimens: * No specimens in log *   Findings: Bilateral direct inguinal hernias. Complications: None. Implants:   Implant Name Type Inv.  Item Serial No.  Lot No. LRB No. Used Action   MESH ABSORB SURG PROGRIP 10X15 -- PROGRIP - [id number]  MESH ABSORB SURG PROGRIP 10X15 -- PROGRIP  [employer]  SURGICAL [id number] Right 1 Implanted   MESH ABSORB SURG PROGRIP 10X15 -- PROGRIP - [id number]  MESH ABSORB SURG PROGRIP 10X15 -- PROGRIP  [employer]  SURGICAL [id number] N/A 1 Implanted

## 2020-03-17 NOTE — PROGRESS NOTES
Date of Surgery Update:  Josué Ureña was seen and examined. History and physical has been reviewed. The patient has been examined. There have been no significant clinical changes since the completion of the originally dated History and Physical. Will proceed with robotic bilateral inguinal hernias repair with meshes.      Signed By: Ramon León MD     March 17, 2020 8:55 AM

## 2020-03-17 NOTE — ANESTHESIA PREPROCEDURE EVALUATION
Relevant Problems   No relevant active problems       Anesthetic History   No history of anesthetic complications            Review of Systems / Medical History  Patient summary reviewed and pertinent labs reviewed    Pulmonary  Within defined limits                 Neuro/Psych   Within defined limits           Cardiovascular    Hypertension              Exercise tolerance: >4 METS     GI/Hepatic/Renal  Within defined limits              Endo/Other        Obesity     Other Findings   Comments:                  Physical Exam    Airway  Mallampati: III  TM Distance: 4 - 6 cm  Neck ROM: normal range of motion   Mouth opening: Normal     Cardiovascular  Regular rate and rhythm,  S1 and S2 normal,  no murmur, click, rub, or gallop  Rhythm: regular  Rate: normal         Dental  No notable dental hx       Pulmonary  Breath sounds clear to auscultation               Abdominal  GI exam deferred       Other Findings            Anesthetic Plan    ASA: 3  Anesthesia type: general          Induction: Intravenous  Anesthetic plan and risks discussed with: Patient

## 2020-03-17 NOTE — OP NOTES
[facility]  OPERATIVE REPORT    Name:  Ladean Brunner  MR#:   [id number]  :  1965  ACCOUNT #:  [de-identified]  DATE OF SERVICE:  2020    PREOPERATIVE DIAGNOSIS:  Bilateral inguinal hernia. POSTOPERATIVE DIAGNOSIS:  Bilateral direct inguinal hernia. PROCEDURE PERFORMED:  Robotic repair of bilateral inguinal hernia with placement of mesh x2. SURGEON:  Rossi Wyatt. Isabela Baldwin MD    ASSISTANT:  Real Hendricks    ANESTHESIA:  General.    COMPLICATIONS:  None. SPECIMENS REMOVED:  None. IMPLANTS:  ProGrip Covidien mesh 4 x 6 inches two of them. ESTIMATED BLOOD LOSS:  Minimal.    DETAILS OF PROCEDURE:  The patient was brought to the operating room. Anesthesia was induced. Scrubbing and draping of the abdomen was done in usual manner. A time-out was performed. A skin incision in the supraumbilical area was performed. Veress needle was inserted. Saline drop test was performed. Abdomen was insufflated. An 8 mm port was inserted. Abdomen was explored. There were 2 large direct inguinal hernias bilaterally. At this point, TAP block was performed on both sides of the abdominal wall, and two ports were placed under direct visualization on the right and left side of the abdomen wall. The patient was placed in Trendelenburg position. Attention was paid to the right side where the peritoneum was opened. The preperitoneal space was dissected. The hernia sac was completely dissected. The inferior epigastric vessel and cord structures were identified and protected. The Kole's ligament was seen. At this point, a ProGrip Covidien mesh 4 x 6 inch was placed in the preperitoneal space. Then attention was paid to the left side of the abdominal wall and the left groin area. The peritoneal was also opened. The preperitoneal area space was dissected, and the hernia sac was completely dissected.   The inferior epigastric vessel and vas and cord structures were identified and protected as well, and the Kole's ligament was identified on this side as well and then another 4 x 6 inch ProGrip Covidien mesh was placed, and the two meshes were overlapping in the medial aspect. At this point, both mesh were fixed with interrupted 2-0 Vicryl sutures three of them and then the peritoneum was closed first on the right side with a running 2-0 V-Loc suture 6 inches and then another 2-0 V-Loc suture was used to close the peritoneum on the left side as well. Hemostasis was secured, instruments were removed, the robot was undocked, and the skin incisions were closed with 4-0 Monocryl and glue.     Rebecca Bales MD      [id number]  D:  03/17/2020 11:56  T:  03/17/2020 17:37  JOB #:  [id number]

## 2020-03-17 NOTE — PERIOP NOTES
Phase 2 Recovery Summary    Report received from [address]    Vitals:    03/17/20 1257 03/17/20 1327 03/17/20 1342 03/17/20 1353   BP: 120/82 130/81 131/86 127/82   Pulse: (!) 48 (!) 52 66 (!) 56   Resp: 18 17 17 16   Temp:       SpO2: 94% 96% 97% 97%   Weight:       Height:           oriented to time, place, person and situation          Lines and Drains  Peripheral Intravenous Line: Removed prior to discharge. Peripheral IV 03/17/20 Right Hand (Active)   Site Assessment Clean, dry, & intact 3/17/2020  1:42 PM   Phlebitis Assessment 0 3/17/2020  1:42 PM   Infiltration Assessment 0 3/17/2020  1:42 PM   Dressing Status Clean, dry, & intact 3/17/2020  1:42 PM   Dressing Type Transparent 3/17/2020  1:42 PM   Hub Color/Line Status Pink; Infusing 3/17/2020  1:42 PM   Alcohol Cap Used Yes 3/17/2020  8:48 AM       Wound  Wound Abdomen Anterior TROCAR SITES X 3 (Active)   Dressing Status Clean, dry, and intact 3/17/2020  1:53 PM   Dressing Type Topical skin adhesive/glue 3/17/2020  1:53 PM   Incision Site Well Approximated Yes 3/17/2020  1:53 PM   Number of days: 0       Wound Abdomen (Active)   Number of days: 0        Patient assisted to chair with minimal assist. Pateint tolerating liquids well. Patient's family at bedside. Discharge discussed with patient and family. No questions or concerns at this time. Patient had time to ask any questions. Discharge medications reviewed with patient and spouse and appropriate educational materials and side effects teaching were provided. Patient discharged to home with family.        Ariel Miranda RN

## 2020-03-17 NOTE — ANESTHESIA POSTPROCEDURE EVALUATION
Procedure(s):  Davinci bilateral inguinal hernia repair with mesh ROBOTIC ASSISTED.     general    Anesthesia Post Evaluation      Multimodal analgesia: multimodal analgesia used between 6 hours prior to anesthesia start to PACU discharge  Patient location during evaluation: bedside  Patient participation: complete - patient participated  Level of consciousness: awake  Pain management: adequate  Airway patency: patent  Anesthetic complications: no  Cardiovascular status: stable  Respiratory status: acceptable  Hydration status: acceptable  Post anesthesia nausea and vomiting:  controlled      Vitals Value Taken Time   /82 3/17/2020  1:53 PM   Temp 36.2 °C (97.2 °F) 3/17/2020 12:12 PM   Pulse 56 3/17/2020  1:53 PM   Resp 16 3/17/2020  1:53 PM   SpO2 97 % 3/17/2020  1:53 PM

## 2020-03-30 ENCOUNTER — VIRTUAL VISIT (OUTPATIENT)
Dept: SURGERY | Age: 55
End: 2020-03-30

## 2020-03-30 VITALS — WEIGHT: 195 LBS | BODY MASS INDEX: 29.55 KG/M2 | HEIGHT: 68 IN

## 2020-03-30 DIAGNOSIS — Z09 POSTOPERATIVE EXAMINATION: Primary | ICD-10-CM

## 2020-03-30 NOTE — PROGRESS NOTES
This postop visit was done on the phone secondary to the COVID-19 pandemic.  Patient is doing great  He denies any complaints  He is tolerating regular diet and having normal bowel movement  He stated that his wounds look good to him and bilateral groin looks normal  Follow-up as needed

## 2020-04-08 ENCOUNTER — VIRTUAL VISIT (OUTPATIENT)
Dept: SURGERY | Age: 55
End: 2020-04-08

## 2020-04-08 VITALS — BODY MASS INDEX: 29.65 KG/M2 | HEIGHT: 68 IN

## 2020-04-08 DIAGNOSIS — Z09 POSTOPERATIVE EXAMINATION: Primary | ICD-10-CM

## 2020-04-08 NOTE — PROGRESS NOTES
This office visit was done on the telephone. The patient stated that he is having some pain but it is getting better. However he stated that he has a small lump in the left groin. I explained to him that this could be a small seroma or hematoma and for now it is okay to observe it. He is tolerating regular diet and having normal bowel movement   Follow-up with me in June if the knot is still there.

## 2020-04-08 NOTE — PROGRESS NOTES
Nishi Calix is a 47 y.o. male (: 1965) presenting to address:    Chief Complaint   Patient presents with    Post OP Follow Up     Bilateral inguinal hernia repair done on 20       Medication list and allergies have been reviewed with Nishi Yogi and updated as of today's date. I have gone over all Medical, Surgical and Social History with Nishi Calix and updated/added the information accordingly. 1. Have you been to the ER, Urgent Care or Hospitalized since your last visit? NO      2. Have you followed up with your PCP or any other Physicians since your procedure/ last office visit?    NO

## 2021-01-26 ENCOUNTER — APPOINTMENT (OUTPATIENT)
Dept: GENERAL RADIOLOGY | Age: 56
End: 2021-01-26
Attending: PHYSICIAN ASSISTANT
Payer: COMMERCIAL

## 2021-01-26 ENCOUNTER — HOSPITAL ENCOUNTER (EMERGENCY)
Age: 56
Discharge: HOME | End: 2021-01-26
Attending: EMERGENCY MEDICINE
Payer: COMMERCIAL

## 2021-01-26 VITALS
DIASTOLIC BLOOD PRESSURE: 99 MMHG | SYSTOLIC BLOOD PRESSURE: 150 MMHG | RESPIRATION RATE: 22 BRPM | OXYGEN SATURATION: 96 % | HEART RATE: 66 BPM | BODY MASS INDEX: 29.65 KG/M2 | WEIGHT: 195 LBS | TEMPERATURE: 98.6 F

## 2021-01-26 DIAGNOSIS — U07.1 PNEUMONIA DUE TO COVID-19 VIRUS: ICD-10-CM

## 2021-01-26 DIAGNOSIS — U07.1 COVID-19 VIRUS INFECTION: Primary | ICD-10-CM

## 2021-01-26 DIAGNOSIS — J12.82 PNEUMONIA DUE TO COVID-19 VIRUS: ICD-10-CM

## 2021-01-26 DIAGNOSIS — R03.0 ELEVATED BLOOD PRESSURE READING: ICD-10-CM

## 2021-01-26 PROCEDURE — 99284 EMERGENCY DEPT VISIT MOD MDM: CPT

## 2021-01-26 PROCEDURE — 71045 X-RAY EXAM CHEST 1 VIEW: CPT

## 2021-01-26 RX ORDER — AZITHROMYCIN 250 MG/1
TABLET, FILM COATED ORAL
Qty: 6 TAB | Refills: 0 | Status: SHIPPED | OUTPATIENT
Start: 2021-01-26 | End: 2021-01-31

## 2021-01-26 RX ORDER — ALBUTEROL SULFATE 90 UG/1
2 AEROSOL, METERED RESPIRATORY (INHALATION)
Qty: 1 INHALER | Refills: 0 | Status: SHIPPED | OUTPATIENT
Start: 2021-01-26

## 2021-01-26 RX ORDER — BENZONATATE 100 MG/1
100-200 CAPSULE ORAL
Qty: 30 CAP | Refills: 0 | Status: SHIPPED | OUTPATIENT
Start: 2021-01-26 | End: 2021-02-02

## 2021-01-26 NOTE — LETTER
[facility] EMERGENCY DEPT 
[address] 
[address] [facility] [address] [phone] Work/School Note Date: 1/26/2021 To Whom It May concern: 
 
Juanita Abernathy was evaulated by the following provider(s): 
Attending Provider: John Joseph DO Physician Assistant: AUSTEN Aj.   COVID19 virus is suspected. Per the CDC guidelines we recommend home isolation until the following conditions are all met: 1. At least 10 days have passed since symptoms first appeared and 2. At least 24 hours have passed since last fever without the use of fever-reducing medications and 
3. Symptoms (e.g., cough, shortness of breath) have improved Sincerely, 
 
 
 
 
AUSTEN Melvin

## 2021-01-26 NOTE — ED PROVIDER NOTES
EMERGENCY DEPARTMENT HISTORY AND PHYSICAL EXAM    Date: 1/26/2021  Patient Name: Melinda Sun    History of Presenting Illness     Chief Complaint:   Chief Complaint   Patient presents with    Positive For Covid-19     History Provided By: Patient    Additional History (Context): Melinda Sun is a 54 y.o. male with hx of HTN, hyperlipidemia, ambulatory to ER complaining of Covid symptoms x1 week with worsening shortness of breath today. Notes central chest discomfort and cough. Patient reports a positive Covid test from [facility] today. Denies fevers, chills, change in activity level or appetite, nausea, vomiting, change in B/B patterns. PCP: Ramon Gallo MD    Current Outpatient Medications   Medication Sig Dispense Refill    albuterol (PROVENTIL HFA, VENTOLIN HFA, PROAIR HFA) 90 mcg/actuation inhaler Take 2 Puffs by inhalation every four (4) hours as needed for Wheezing. 1 Inhaler 0    benzonatate (Tessalon Perles) 100 mg capsule Take 1-2 Caps by mouth three (3) times daily as needed for Cough for up to 7 days. 30 Cap 0    azithromycin (Zithromax Z-David) 250 mg tablet Take two tablets by mouth for day 1, THEN one tablet by mouth daily for days 2 through 5. 6 Tab 0    lisinopril (PRINIVIL, ZESTRIL) 40 mg tablet Take 1 Tab by mouth daily. 90 Tab 3    amLODIPine (NORVASC) 10 mg tablet Take 1 Tab by mouth daily. Take 1 Tab by Mouth Once a Day. 90 Tab 1    atorvastatin (LIPITOR) 40 mg tablet Take 1 Tab by mouth daily. Take 1 Tab by Mouth Every Night at Bedtime. 90 Tab 1    aspirin 81 mg chewable tablet Take 1 Tab by mouth daily. Take 81 mg by Mouth Once a Day.  80 Tab 4       Past History     Past Medical History:  Past Medical History:   Diagnosis Date    Hypertension     Other ill-defined conditions(799.89)     cardiac monitoring- history of syncope    Pure hypercholesterolemia 12/1/2011    Syncope     Loop Recorder       Past Surgical History:  Past Surgical History: Procedure Laterality Date    HX HERNIA REPAIR Bilateral 03/17/2020    Bilateral inguinal hernia repair with 4x6 x 2 mesh    HX OTHER SURGICAL      cardiac monitoring device implant       Family History:  Family History   Problem Relation Age of Onset    Hypertension Mother     Heart Disease Mother     Cancer Mother     Cancer Father        Social History:  Social History     Tobacco Use    Smoking status: Never Smoker    Smokeless tobacco: Never Used   Substance Use Topics    Alcohol use: Not Currently     Comment: occ    Drug use: No       Allergies:  No Known Allergies      Review of Systems   Review of Systems   Constitutional: Negative for activity change, appetite change, chills and fever. HENT: Negative. Eyes: Negative for visual disturbance. Respiratory: Positive for cough and shortness of breath. Negative for chest tightness. Cardiovascular: Positive for chest pain (mild, central chest discomfort). Negative for palpitations and leg swelling. Gastrointestinal: Negative for abdominal pain, blood in stool, constipation, diarrhea, nausea and vomiting. Genitourinary: Negative for dysuria. Musculoskeletal: Positive for myalgias (body aches). Negative for gait problem and joint swelling. Skin: Negative. Neurological: Negative for dizziness, syncope, weakness and headaches. Hematological: Negative for adenopathy. All other systems reviewed and are negative. All Other Systems Negative  Physical Exam     Vitals:    01/26/21 1758   BP: (!) 156/83   Pulse: 61   Resp: 22   Temp: 98.6 °F (37 °C)   SpO2: 99%   Weight: 88.5 kg (195 lb)     Physical Exam  Vitals signs and nursing note reviewed. Constitutional:       General: He is not in acute distress. Appearance: He is well-developed. HENT:      Head: Normocephalic and atraumatic.       Nose: Nose normal.      Mouth/Throat:      Mouth: Mucous membranes are moist.   Eyes:      Conjunctiva/sclera: Conjunctivae normal.   Neck: Musculoskeletal: Normal range of motion and neck supple. Cardiovascular:      Rate and Rhythm: Normal rate and regular rhythm. Heart sounds: Normal heart sounds. Pulmonary:      Effort: Pulmonary effort is normal. No respiratory distress. Breath sounds: Normal breath sounds. No wheezing, rhonchi or rales. Comments: No cough, no accessory muscle use, speaks in full sentences. RR 17-21 on exam (lower at rest, higher when talks)  Abdominal:      General: Bowel sounds are normal.      Palpations: Abdomen is soft. Tenderness: There is no abdominal tenderness. Musculoskeletal: Normal range of motion. Lymphadenopathy:      Cervical: No cervical adenopathy. Skin:     General: Skin is warm and dry. Capillary Refill: Capillary refill takes less than 2 seconds. Coloration: Skin is not pale. Neurological:      Mental Status: He is alert. Psychiatric:         Mood and Affect: Mood normal.            Diagnostic Study Results     Labs -   No results found for this or any previous visit (from the past 12 hour(s)). Radiologic Studies -   XR CHEST PORT    (Results Pending)     ED interpretation of CXR: RLL opacity suggesting PNA. radiology reading pending. AUSTEN Felton 8:20 PM     CT Results  (Last 48 hours)    None            Medical Decision Making   I am the first provider for this patient. I reviewed the vital signs, available nursing notes, past medical history, past surgical history, family history and social history. Vital Signs-Reviewed the patient's vital signs. Records Reviewed: Nursing Notes and Old Medical Records    Procedures:  Procedures    Provider Notes (Medical Decision Making):     Well-appearing patient who is diagnosed with COVID-19 today presents with complaints of cough, chest discomfort, S OB. Patient appears well, no respiratory distress observed during his visit. VSS, RRR is 17-22. CXR suggesting possible pneumonia in RLL.   Will cover with antibiotics, Z-David. Prescribed albuterol inhaler and Tessalon Perles for symptomatic relief. Patient is stable for discharge. Pt has elevated blood pressure readings here. The reading could be secondary to presenting complaint. No s/sx of end organ damage. The consequences of long term uncontrolled HTN were discussed. The pt was told this must be further monitored by a PCP/clinic within 2 - 4 days and BP tx plan needs to be developed or changed. Take Tylenol/Acetaminophen (every 4-6 hours) and/or Motrin/Ibuprofen/Advil (every 6-8 hours) or Naprosyn/Naproxyn/Aleve for fever or pain as needed. Follow up with your primary care provider or the provided referral for further evaluation and management  Return to emergency room at once for worsening or new symptoms. MED RECONCILIATION:  No current facility-administered medications for this encounter. Current Outpatient Medications   Medication Sig    albuterol (PROVENTIL HFA, VENTOLIN HFA, PROAIR HFA) 90 mcg/actuation inhaler Take 2 Puffs by inhalation every four (4) hours as needed for Wheezing.  benzonatate (Tessalon Perles) 100 mg capsule Take 1-2 Caps by mouth three (3) times daily as needed for Cough for up to 7 days.  azithromycin (Zithromax Z-David) 250 mg tablet Take two tablets by mouth for day 1, THEN one tablet by mouth daily for days 2 through 5.    lisinopril (PRINIVIL, ZESTRIL) 40 mg tablet Take 1 Tab by mouth daily.  amLODIPine (NORVASC) 10 mg tablet Take 1 Tab by mouth daily. Take 1 Tab by Mouth Once a Day.  atorvastatin (LIPITOR) 40 mg tablet Take 1 Tab by mouth daily. Take 1 Tab by Mouth Every Night at Bedtime.  aspirin 81 mg chewable tablet Take 1 Tab by mouth daily. Take 81 mg by Mouth Once a Day. Disposition:  Home     DISCHARGE NOTE:     Pt has been reexamined. No respiratory distress. Patient has no new complaints, changes, or physical findings. Care plan outlined and precautions discussed.     Results of XCR were reviewed with the patient. All medications were reviewed with the patient; will d/c home. All of pt's questions and concerns were addressed. Patient was instructed and agrees to follow up with PCP, as well as to return to the ED upon further deterioration. Patient is ready to go home. Follow-up Information     Follow up With Specialties Details Why Contact Info    Lillie Lam MD Internal Medicine Call in 3 days for recheck of current symptoms, for blood pressure recheck and control [id number] [address] EMERGENCY DEPT Emergency Medicine  As needed, If symptoms worsen [address] [facility] [address].  [phone]          Current Discharge Medication List      START taking these medications    Details   albuterol (PROVENTIL HFA, VENTOLIN HFA, PROAIR HFA) 90 mcg/actuation inhaler Take 2 Puffs by inhalation every four (4) hours as needed for Wheezing. Qty: 1 Inhaler, Refills: 0      benzonatate (Tessalon Perles) 100 mg capsule Take 1-2 Caps by mouth three (3) times daily as needed for Cough for up to 7 days. Qty: 30 Cap, Refills: 0      azithromycin (Zithromax Z-David) 250 mg tablet Take two tablets by mouth for day 1, THEN one tablet by mouth daily for days 2 through 5. Qty: 6 Tab, Refills: 0                 Diagnosis     Clinical Impression:   1. COVID-19 virus infection    2. Elevated blood pressure reading    3. Pneumonia due to COVID-19 virus          Dictation disclaimer:  Please note that this dictation was completed with BPT, the computer voice recognition software. Quite often unanticipated grammatical, syntax, homophones, and other interpretive errors are inadvertently transcribed by the computer software. Please disregard these errors. Please excuse any errors that have escaped final proofreading.

## 2021-01-26 NOTE — ED TRIAGE NOTES
Patient reports a positive covid test from [facility] today. Reports covid symptoms x 1 week with worsening shortness of breath today.  Patient speaking in full sentences while walking to room with triage nurse

## 2021-01-27 NOTE — DISCHARGE INSTRUCTIONS
Radha Bradley are diagnosed with COVID-19 virus. You must self-quarantine at home and not leave for any reason until you follow-up with your PCP to ensure resolution of infection. Come back to the hospital if you become short of breath at rest, without exerting yourself. This is a sign that you may need to be admitted to the hospital to be put on oxygen. Take antibiotics as directed. Finish an entire course! Increase intake of over-the-counter probiotics (probiotic supplements, yogurt) when taking antibiotics to prevent antibiotic associated diarrhea and yeast infection. Use inhaler for relief of cough, wheezing, chest tightness and other asthma/bronchitis symptoms as directed as needed. Use over-the-counter cough medications or prescribed Tessalon Perles as needed as directed. Take Tylenol/Acetaminophen (every 4-6 hours) and/or Motrin/Ibuprofen/Advil (every 6-8 hours) or Naprosyn/Naproxyn/Aleve for fever or pain as needed. You have elevated blood pressure readings here. You MUST be further evaluated by a your primary care provider within 2-4 days as you may need to develop or change the way to keep your blood pressure under control. Follow up with your primary care provider or the provided referral for further evaluation and management  Return to emergency room at once for worsening or new symptoms.

## 2021-01-28 ENCOUNTER — PATIENT OUTREACH (OUTPATIENT)
Dept: CASE MANAGEMENT | Age: 56
End: 2021-01-28

## 2021-01-28 NOTE — PROGRESS NOTES
Date/Time:  1/28/2021 9:07 AM   Call within 2 business days of discharge: Yes   Attempted to reach patient by telephone. Left HIPPA compliant message requesting a return call. Will attempt to reach patient again.

## 2021-01-29 ENCOUNTER — PATIENT OUTREACH (OUTPATIENT)
Dept: CASE MANAGEMENT | Age: 56
End: 2021-01-29

## 2021-01-29 NOTE — PROGRESS NOTES
Patient contacted regarding [id number] diagnosis\". Discussed COVID-19 related testing which was not done at this time. Test results were not done. Patient informed of results, if available? no     LPN Care Coordinator contacted the patient by telephone to perform post discharge assessment. Call within 2 business days of discharge: Yes Verified name and  with patient as identifiers. Provided introduction to self, and explanation of the CTN/ACM/LPN role, and reason for call due to risk factors for infection and/or exposure to COVID-19. Symptoms reviewed with patient who verbalized the following symptoms: fever, fatigue, no new symptoms and no worsening symptoms      Due to no new or worsening symptoms encounter was not routed to provider for escalation. Discussed follow-up appointments. If no appointment was previously scheduled, appointment scheduling offered:  [facility] follow up appointment(s): No future appointments. Non-[facility] follow up appointment(s): pcp as needed     Advance Care Planning:   Does patient have an Advance Directive:  healthcare decision maker on file. Patient has following risk factors of: htn. CTN/ACM/LPN reviewed discharge instructions, medical action plan and red flags such as increased shortness of breath, increasing fever and signs of decompensation with patient who verbalized understanding. Discussed exposure protocols and quarantine with CDC Guidelines What to do if you are sick with coronavirus disease .  Patient was given an opportunity for questions and concerns. The patient agrees to contact the [employer] exposure line [phone], [facility] [address]  ([phone] and PCP office for questions related to their healthcare. CTN/ACM/LPN provided contact information for future needs.     Reviewed and educated patient on any new and changed medications related to discharge diagnosis     Patient/family/caregiver given information for [employer] and agrees to enroll no  Patient's preferred e-mail: n/a   Patient's preferred phone number: n/a  Based on Loop alert triggers, patient will be contacted by nurse care manager for worsening symptoms. Plan for follow-up call in 5-7 days based on severity of symptoms and risk factors.

## 2021-02-05 ENCOUNTER — PATIENT OUTREACH (OUTPATIENT)
Dept: CASE MANAGEMENT | Age: 56
End: 2021-02-05

## 2021-02-05 NOTE — PROGRESS NOTES
Date/Time:  2/5/2021 12:25 PM   Call within 2 business days of discharge: Yes   Attempted to reach patient by telephone. Left HIPPA compliant message requesting a return call. Will attempt to reach patient again.

## 2024-07-20 ENCOUNTER — APPOINTMENT (OUTPATIENT)
Facility: HOSPITAL | Age: 59
DRG: 062 | End: 2024-07-20

## 2024-07-20 ENCOUNTER — HOSPITAL ENCOUNTER (INPATIENT)
Facility: HOSPITAL | Age: 59
LOS: 3 days | Discharge: HOME | DRG: 062 | End: 2024-07-23
Attending: STUDENT IN AN ORGANIZED HEALTH CARE EDUCATION/TRAINING PROGRAM | Admitting: INTERNAL MEDICINE

## 2024-07-20 DIAGNOSIS — R79.89 ELEVATED TROPONIN: ICD-10-CM

## 2024-07-20 DIAGNOSIS — I10 ESSENTIAL HYPERTENSION: ICD-10-CM

## 2024-07-20 DIAGNOSIS — I63.9 ISCHEMIC STROKE (HCC): ICD-10-CM

## 2024-07-20 DIAGNOSIS — I63.9 CEREBROVASCULAR ACCIDENT (CVA), UNSPECIFIED MECHANISM (HCC): Primary | ICD-10-CM

## 2024-07-20 PROBLEM — R09.89 SUSPECTED CEREBROVASCULAR ACCIDENT (CVA): Status: ACTIVE | Noted: 2024-07-20

## 2024-07-20 LAB
ANION GAP SERPL CALC-SCNC: 6 MMOL/L (ref 3–18)
APPEARANCE UR: CLEAR
BACTERIA URNS QL MICRO: NEGATIVE /HPF
BASOPHILS # BLD: 0 K/UL (ref 0–0.1)
BASOPHILS NFR BLD: 1 % (ref 0–2)
BILIRUB UR QL: NEGATIVE
BUN SERPL-MCNC: 12 MG/DL (ref 7–18)
BUN/CREAT SERPL: 9 (ref 12–20)
CALCIUM SERPL-MCNC: 9.3 MG/DL (ref 8.5–10.1)
CHLORIDE SERPL-SCNC: 108 MMOL/L (ref 100–111)
CO2 SERPL-SCNC: 27 MMOL/L (ref 21–32)
COLOR UR: ABNORMAL
CREAT SERPL-MCNC: 1.27 MG/DL (ref 0.6–1.3)
DIFFERENTIAL METHOD BLD: ABNORMAL
EOSINOPHIL # BLD: 0.2 K/UL (ref 0–0.4)
EOSINOPHIL NFR BLD: 3 % (ref 0–5)
EPITH CASTS URNS QL MICRO: NORMAL /LPF (ref 0–5)
ERYTHROCYTE [DISTWIDTH] IN BLOOD BY AUTOMATED COUNT: 14.7 % (ref 11.6–14.5)
GLUCOSE BLD STRIP.AUTO-MCNC: 102 MG/DL (ref 70–110)
GLUCOSE SERPL-MCNC: 103 MG/DL (ref 74–99)
GLUCOSE UR STRIP.AUTO-MCNC: NEGATIVE MG/DL
HCT VFR BLD AUTO: 46 % (ref 36–48)
HGB BLD-MCNC: 14.9 G/DL (ref 13–16)
HGB UR QL STRIP: ABNORMAL
IMM GRANULOCYTES # BLD AUTO: 0 K/UL (ref 0–0.04)
IMM GRANULOCYTES NFR BLD AUTO: 0 % (ref 0–0.5)
INR PPP: 1 (ref 0.9–1.1)
KETONES UR QL STRIP.AUTO: NEGATIVE MG/DL
LEUKOCYTE ESTERASE UR QL STRIP.AUTO: ABNORMAL
LYMPHOCYTES # BLD: 1.4 K/UL (ref 0.9–3.6)
LYMPHOCYTES NFR BLD: 28 % (ref 21–52)
MCH RBC QN AUTO: 27.8 PG (ref 24–34)
MCHC RBC AUTO-ENTMCNC: 32.4 G/DL (ref 31–37)
MCV RBC AUTO: 85.8 FL (ref 78–100)
MONOCYTES # BLD: 0.4 K/UL (ref 0.05–1.2)
MONOCYTES NFR BLD: 8 % (ref 3–10)
NEUTS SEG # BLD: 3 K/UL (ref 1.8–8)
NEUTS SEG NFR BLD: 60 % (ref 40–73)
NITRITE UR QL STRIP.AUTO: NEGATIVE
NRBC # BLD: 0 K/UL (ref 0–0.01)
NRBC BLD-RTO: 0 PER 100 WBC
NT PRO BNP: 241 PG/ML (ref 0–900)
PH UR STRIP: 8 (ref 5–8)
PLATELET # BLD AUTO: 275 K/UL (ref 135–420)
PMV BLD AUTO: 9.1 FL (ref 9.2–11.8)
POTASSIUM SERPL-SCNC: 3.7 MMOL/L (ref 3.5–5.5)
PROT UR STRIP-MCNC: ABNORMAL MG/DL
PROTHROMBIN TIME: 13.5 SEC (ref 11.9–14.9)
RBC # BLD AUTO: 5.36 M/UL (ref 4.35–5.65)
RBC #/AREA URNS HPF: NORMAL /HPF (ref 0–5)
SODIUM SERPL-SCNC: 141 MMOL/L (ref 136–145)
SP GR UR REFRACTOMETRY: >1.03 (ref 1–1.03)
TROPONIN I SERPL HS-MCNC: 107 NG/L (ref 0–78)
UROBILINOGEN UR QL STRIP.AUTO: 1 EU/DL (ref 0.2–1)
WBC # BLD AUTO: 4.9 K/UL (ref 4.6–13.2)
WBC URNS QL MICRO: NORMAL /HPF (ref 0–4)

## 2024-07-20 PROCEDURE — 6360000002 HC RX W HCPCS: Performed by: STUDENT IN AN ORGANIZED HEALTH CARE EDUCATION/TRAINING PROGRAM

## 2024-07-20 PROCEDURE — 2000000000 HC ICU R&B

## 2024-07-20 PROCEDURE — 84484 ASSAY OF TROPONIN QUANT: CPT

## 2024-07-20 PROCEDURE — 71045 X-RAY EXAM CHEST 1 VIEW: CPT

## 2024-07-20 PROCEDURE — 2580000003 HC RX 258: Performed by: STUDENT IN AN ORGANIZED HEALTH CARE EDUCATION/TRAINING PROGRAM

## 2024-07-20 PROCEDURE — 85025 COMPLETE CBC W/AUTO DIFF WBC: CPT

## 2024-07-20 PROCEDURE — 94761 N-INVAS EAR/PLS OXIMETRY MLT: CPT

## 2024-07-20 PROCEDURE — 6360000004 HC RX CONTRAST MEDICATION: Performed by: STUDENT IN AN ORGANIZED HEALTH CARE EDUCATION/TRAINING PROGRAM

## 2024-07-20 PROCEDURE — 99285 EMERGENCY DEPT VISIT HI MDM: CPT

## 2024-07-20 PROCEDURE — 37195 THROMBOLYTIC THERAPY STROKE: CPT

## 2024-07-20 PROCEDURE — 81001 URINALYSIS AUTO W/SCOPE: CPT

## 2024-07-20 PROCEDURE — 70498 CT ANGIOGRAPHY NECK: CPT

## 2024-07-20 PROCEDURE — 70450 CT HEAD/BRAIN W/O DYE: CPT

## 2024-07-20 PROCEDURE — 83880 ASSAY OF NATRIURETIC PEPTIDE: CPT

## 2024-07-20 PROCEDURE — 82962 GLUCOSE BLOOD TEST: CPT

## 2024-07-20 PROCEDURE — 96374 THER/PROPH/DIAG INJ IV PUSH: CPT

## 2024-07-20 PROCEDURE — 96375 TX/PRO/DX INJ NEW DRUG ADDON: CPT

## 2024-07-20 PROCEDURE — 80048 BASIC METABOLIC PNL TOTAL CA: CPT

## 2024-07-20 PROCEDURE — 85610 PROTHROMBIN TIME: CPT

## 2024-07-20 PROCEDURE — 2580000003 HC RX 258: Performed by: PHYSICIAN ASSISTANT

## 2024-07-20 PROCEDURE — 6370000000 HC RX 637 (ALT 250 FOR IP)

## 2024-07-20 PROCEDURE — 93005 ELECTROCARDIOGRAM TRACING: CPT | Performed by: PHYSICIAN ASSISTANT

## 2024-07-20 PROCEDURE — 3E03317 INTRODUCTION OF OTHER THROMBOLYTIC INTO PERIPHERAL VEIN, PERCUTANEOUS APPROACH: ICD-10-PCS | Performed by: HOSPITALIST

## 2024-07-20 RX ORDER — POTASSIUM CHLORIDE 29.8 MG/ML
20 INJECTION INTRAVENOUS PRN
Status: DISCONTINUED | OUTPATIENT
Start: 2024-07-20 | End: 2024-07-23 | Stop reason: HOSPADM

## 2024-07-20 RX ORDER — ONDANSETRON 2 MG/ML
4 INJECTION INTRAMUSCULAR; INTRAVENOUS EVERY 6 HOURS PRN
Status: DISCONTINUED | OUTPATIENT
Start: 2024-07-20 | End: 2024-07-23 | Stop reason: HOSPADM

## 2024-07-20 RX ORDER — ACETAMINOPHEN 325 MG/1
650 TABLET ORAL EVERY 6 HOURS PRN
Status: DISCONTINUED | OUTPATIENT
Start: 2024-07-20 | End: 2024-07-23 | Stop reason: HOSPADM

## 2024-07-20 RX ORDER — ASPIRIN 300 MG/1
300 SUPPOSITORY RECTAL DAILY
Status: DISCONTINUED | OUTPATIENT
Start: 2024-07-21 | End: 2024-07-23 | Stop reason: HOSPADM

## 2024-07-20 RX ORDER — SODIUM CHLORIDE 9 MG/ML
INJECTION, SOLUTION INTRAVENOUS PRN
Status: DISCONTINUED | OUTPATIENT
Start: 2024-07-20 | End: 2024-07-23 | Stop reason: HOSPADM

## 2024-07-20 RX ORDER — ENOXAPARIN SODIUM 100 MG/ML
40 INJECTION SUBCUTANEOUS EVERY 24 HOURS
Status: DISCONTINUED | OUTPATIENT
Start: 2024-07-21 | End: 2024-07-23 | Stop reason: HOSPADM

## 2024-07-20 RX ORDER — SODIUM CHLORIDE 0.9 % (FLUSH) 0.9 %
10 SYRINGE (ML) INJECTION ONCE
Status: COMPLETED | OUTPATIENT
Start: 2024-07-20 | End: 2024-07-20

## 2024-07-20 RX ORDER — ASPIRIN 81 MG/1
81 TABLET, CHEWABLE ORAL DAILY
Status: DISCONTINUED | OUTPATIENT
Start: 2024-07-21 | End: 2024-07-23 | Stop reason: HOSPADM

## 2024-07-20 RX ORDER — ATORVASTATIN CALCIUM 40 MG/1
80 TABLET, FILM COATED ORAL NIGHTLY
Status: DISCONTINUED | OUTPATIENT
Start: 2024-07-20 | End: 2024-07-23 | Stop reason: HOSPADM

## 2024-07-20 RX ORDER — NICARDIPINE HYDROCHLORIDE 0.1 MG/ML
2.5 INJECTION INTRAVENOUS CONTINUOUS
Status: DISCONTINUED | OUTPATIENT
Start: 2024-07-20 | End: 2024-07-22

## 2024-07-20 RX ORDER — POLYETHYLENE GLYCOL 3350 17 G/17G
17 POWDER, FOR SOLUTION ORAL DAILY PRN
Status: DISCONTINUED | OUTPATIENT
Start: 2024-07-20 | End: 2024-07-23 | Stop reason: HOSPADM

## 2024-07-20 RX ORDER — ACETAMINOPHEN 650 MG/1
650 SUPPOSITORY RECTAL EVERY 6 HOURS PRN
Status: DISCONTINUED | OUTPATIENT
Start: 2024-07-20 | End: 2024-07-23 | Stop reason: HOSPADM

## 2024-07-20 RX ORDER — HYDRALAZINE HYDROCHLORIDE 20 MG/ML
10 INJECTION INTRAMUSCULAR; INTRAVENOUS ONCE
Status: DISCONTINUED | OUTPATIENT
Start: 2024-07-20 | End: 2024-07-22

## 2024-07-20 RX ORDER — HYDRALAZINE HYDROCHLORIDE 20 MG/ML
10 INJECTION INTRAMUSCULAR; INTRAVENOUS ONCE
Status: COMPLETED | OUTPATIENT
Start: 2024-07-20 | End: 2024-07-20

## 2024-07-20 RX ORDER — SODIUM CHLORIDE 0.9 % (FLUSH) 0.9 %
5-40 SYRINGE (ML) INJECTION PRN
Status: DISCONTINUED | OUTPATIENT
Start: 2024-07-20 | End: 2024-07-23 | Stop reason: HOSPADM

## 2024-07-20 RX ORDER — MAGNESIUM SULFATE IN WATER 40 MG/ML
2000 INJECTION, SOLUTION INTRAVENOUS PRN
Status: DISCONTINUED | OUTPATIENT
Start: 2024-07-20 | End: 2024-07-23 | Stop reason: HOSPADM

## 2024-07-20 RX ORDER — POTASSIUM CHLORIDE 7.45 MG/ML
10 INJECTION INTRAVENOUS PRN
Status: DISCONTINUED | OUTPATIENT
Start: 2024-07-20 | End: 2024-07-23 | Stop reason: HOSPADM

## 2024-07-20 RX ORDER — ONDANSETRON 4 MG/1
4 TABLET, ORALLY DISINTEGRATING ORAL EVERY 8 HOURS PRN
Status: DISCONTINUED | OUTPATIENT
Start: 2024-07-20 | End: 2024-07-23 | Stop reason: HOSPADM

## 2024-07-20 RX ORDER — SODIUM CHLORIDE 0.9 % (FLUSH) 0.9 %
5-40 SYRINGE (ML) INJECTION EVERY 12 HOURS SCHEDULED
Status: DISCONTINUED | OUTPATIENT
Start: 2024-07-20 | End: 2024-07-23 | Stop reason: HOSPADM

## 2024-07-20 RX ADMIN — SODIUM CHLORIDE, PRESERVATIVE FREE 10 ML: 5 INJECTION INTRAVENOUS at 21:01

## 2024-07-20 RX ADMIN — Medication 23 MG: at 12:07

## 2024-07-20 RX ADMIN — NICARDIPINE HYDROCHLORIDE 7.5 MG/HR: 0.1 INJECTION INTRAVENOUS at 21:31

## 2024-07-20 RX ADMIN — IOPAMIDOL 85 ML: 755 INJECTION, SOLUTION INTRAVENOUS at 11:09

## 2024-07-20 RX ADMIN — ATORVASTATIN CALCIUM 80 MG: 40 TABLET, FILM COATED ORAL at 21:34

## 2024-07-20 RX ADMIN — SODIUM CHLORIDE, PRESERVATIVE FREE 10 ML: 5 INJECTION INTRAVENOUS at 12:09

## 2024-07-20 RX ADMIN — SODIUM CHLORIDE, PRESERVATIVE FREE 10 ML: 5 INJECTION INTRAVENOUS at 21:00

## 2024-07-20 RX ADMIN — NICARDIPINE HYDROCHLORIDE 2.5 MG/HR: 0.1 INJECTION INTRAVENOUS at 12:41

## 2024-07-20 RX ADMIN — NICARDIPINE HYDROCHLORIDE 7.5 MG/HR: 0.1 INJECTION INTRAVENOUS at 18:47

## 2024-07-20 RX ADMIN — SODIUM CHLORIDE, PRESERVATIVE FREE 10 ML: 5 INJECTION INTRAVENOUS at 12:08

## 2024-07-20 RX ADMIN — HYDRALAZINE HYDROCHLORIDE 10 MG: 20 INJECTION INTRAMUSCULAR; INTRAVENOUS at 11:22

## 2024-07-20 NOTE — ED TRIAGE NOTES
Pt presents to triage for hypertension. Pt was at an appointment and was told he needed to proceed to ER for BP. Endorses blurred vision, headache, and occasional dizziness. Pt not taking antihypertensives as ordered, takes every other day sometimes.    Pt has R sided droop noted to mouth and states \"I feel like my face is drooping.\" Pt diaphoretic.     Code stroke called and brought immediately to stroke bed for further evaluation.    Glucose 102.

## 2024-07-20 NOTE — ED NOTES
Patient informed this RN that he just had one episode of blood tinged urine.  Patient gave a urine sample upon arrival, and that urine was yellow in color at the time, so this is a new occurrence.  Patient denies any worsening or symptoms and patient still has a NIH score of 0 at this time.  Patient will notify this RN if blood in urine becomes worse.    Dr. Jimenes notifed and MD said to continue to monitor patient at this time and to notify if bleeding becomes worse.

## 2024-07-20 NOTE — H&P
[facility]  Pulmonary, Critical Care, and Sleep Medicine    Name: Thanh Hess MRN: [id number]   : 1965 Hospital: [facility]   Date: 2024        Critical Care History and Physical      IMPRESSION:   Suspected stroke with right-sided facial droop left-sided upper and lower extremity weakness status post TNK at 1207 with resolution of symptoms  Hypertensive urgency on Cardene drip  History of high blood pressure  Coronary disease status post stent per patient  Previous suspected stroke 2022     Patient Active Problem List   Diagnosis    Advance directive discussed with patient    Essential hypertension    Arthritis of knee    Pure hypercholesterolemia    Suspected cerebrovascular accident (CVA)    Stroke aborted by administration of thrombolytic agent (HCC)        RECOMMENDATIONS:   Neuro: Frequent neurochecks looking for changes in status  Pulm: Supplemental O2 via NC, titrate flow for goal SPO2> 90% Bronchodilators, steroids, pulmonary hygiene care, Aspiration precautions, Keep HOB >30 degrees  CVS : No acute issues.   Fluids: None  GI: SUP, Trend LFTs, Zofran PRN for N/V, n.p.o.  Renal:  Trend Renal indices, Strict Is/Os,   Hem/Onc: Monitor for s/o active bleeding.   I/D: No infectious etiology watch trend temperature and WBC curve.  Endocrine: Q6 glucoses, SSI. Check TSH level  Metabolic:  Daily BMP, mag, phos. Trend lytes, replace as needed.   Musc/Skin: no acute issues, wound care    Full Code No additional code details       Best practice : APPLICABLE TO PATIENT    Glycemic control  Stress ulcer prophylaxis.   not indicated  DVT prophylaxis.   Not indicated patient is status post TNK  Need for Lines, lynn assessed.  Palliative care evaluation.  Restraints not need.            Subjective/History:     This patient has been seen and evaluated at the request of Dr. Rosales Garcia for stroke status post TNK.    24    Patient is a 58 y.o. male past

## 2024-07-20 NOTE — ED NOTES
TNK drawn up but not given at this time.  Patient BP after 10 mg hydralazine was 145 86.  After repeating BP for administration of TNK, patients BP noted to be 190 /97, 187/98, and 198/96. BP cuff even placed on opposite arm with no change in BP noted.

## 2024-07-20 NOTE — ED PROVIDER NOTES
MEDICATIONS:  New Prescriptions    No medications on file     Controlled Substances Monitoring:          No data to display                (Please note that portions of this note were completed with a voice recognition program.  Efforts were made to edit the dictations but occasionally words are mis-transcribed.)    Rosales Garcia III, MD (electronically signed)  Attending Emergency Physician           Rosales Garcia III, MD  07/20/24 7041

## 2024-07-21 ENCOUNTER — APPOINTMENT (OUTPATIENT)
Facility: HOSPITAL | Age: 59
DRG: 062 | End: 2024-07-21

## 2024-07-21 PROBLEM — I63.9 CEREBROVASCULAR ACCIDENT (CVA) (HCC): Status: ACTIVE | Noted: 2024-07-21

## 2024-07-21 LAB
ANION GAP SERPL CALC-SCNC: 7 MMOL/L (ref 3–18)
BASOPHILS # BLD: 0 K/UL (ref 0–0.1)
BASOPHILS NFR BLD: 1 % (ref 0–2)
BUN SERPL-MCNC: 13 MG/DL (ref 7–18)
BUN/CREAT SERPL: 12 (ref 12–20)
CALCIUM SERPL-MCNC: 8.1 MG/DL (ref 8.5–10.1)
CHLORIDE SERPL-SCNC: 110 MMOL/L (ref 100–111)
CHOLEST SERPL-MCNC: 186 MG/DL
CO2 SERPL-SCNC: 23 MMOL/L (ref 21–32)
CREAT SERPL-MCNC: 1.12 MG/DL (ref 0.6–1.3)
DIFFERENTIAL METHOD BLD: ABNORMAL
EKG ATRIAL RATE: 46 BPM
EKG DIAGNOSIS: NORMAL
EKG P AXIS: 35 DEGREES
EKG P-R INTERVAL: 188 MS
EKG Q-T INTERVAL: 496 MS
EKG QRS DURATION: 108 MS
EKG QTC CALCULATION (BAZETT): 434 MS
EKG R AXIS: -25 DEGREES
EKG T AXIS: -41 DEGREES
EKG VENTRICULAR RATE: 46 BPM
EOSINOPHIL # BLD: 0.2 K/UL (ref 0–0.4)
EOSINOPHIL NFR BLD: 3 % (ref 0–5)
ERYTHROCYTE [DISTWIDTH] IN BLOOD BY AUTOMATED COUNT: 14.7 % (ref 11.6–14.5)
EST. AVERAGE GLUCOSE BLD GHB EST-MCNC: 108 MG/DL
GLUCOSE SERPL-MCNC: 120 MG/DL (ref 74–99)
HBA1C MFR BLD: 5.4 % (ref 4.2–5.6)
HCT VFR BLD AUTO: 42.7 % (ref 36–48)
HDLC SERPL-MCNC: 34 MG/DL (ref 40–60)
HDLC SERPL: 5.5 (ref 0–5)
HGB BLD-MCNC: 14.5 G/DL (ref 13–16)
IMM GRANULOCYTES # BLD AUTO: 0 K/UL (ref 0–0.04)
IMM GRANULOCYTES NFR BLD AUTO: 0 % (ref 0–0.5)
LDLC SERPL CALC-MCNC: 100.6 MG/DL (ref 0–100)
LIPID PANEL: ABNORMAL
LYMPHOCYTES # BLD: 1.2 K/UL (ref 0.9–3.6)
LYMPHOCYTES NFR BLD: 20 % (ref 21–52)
MAGNESIUM SERPL-MCNC: 2 MG/DL (ref 1.6–2.6)
MCH RBC QN AUTO: 28.5 PG (ref 24–34)
MCHC RBC AUTO-ENTMCNC: 34 G/DL (ref 31–37)
MCV RBC AUTO: 84.1 FL (ref 78–100)
MONOCYTES # BLD: 0.4 K/UL (ref 0.05–1.2)
MONOCYTES NFR BLD: 7 % (ref 3–10)
NEUTS SEG # BLD: 4.1 K/UL (ref 1.8–8)
NEUTS SEG NFR BLD: 70 % (ref 40–73)
NRBC # BLD: 0 K/UL (ref 0–0.01)
NRBC BLD-RTO: 0 PER 100 WBC
PHOSPHATE SERPL-MCNC: 2.7 MG/DL (ref 2.5–4.9)
PLATELET # BLD AUTO: 253 K/UL (ref 135–420)
PMV BLD AUTO: 9.3 FL (ref 9.2–11.8)
POTASSIUM SERPL-SCNC: 3 MMOL/L (ref 3.5–5.5)
RBC # BLD AUTO: 5.08 M/UL (ref 4.35–5.65)
SODIUM SERPL-SCNC: 140 MMOL/L (ref 136–145)
TRIGL SERPL-MCNC: 257 MG/DL
VLDLC SERPL CALC-MCNC: 51.4 MG/DL
WBC # BLD AUTO: 5.8 K/UL (ref 4.6–13.2)

## 2024-07-21 PROCEDURE — 80048 BASIC METABOLIC PNL TOTAL CA: CPT

## 2024-07-21 PROCEDURE — 97162 PT EVAL MOD COMPLEX 30 MIN: CPT

## 2024-07-21 PROCEDURE — 85025 COMPLETE CBC W/AUTO DIFF WBC: CPT

## 2024-07-21 PROCEDURE — 6360000002 HC RX W HCPCS: Performed by: STUDENT IN AN ORGANIZED HEALTH CARE EDUCATION/TRAINING PROGRAM

## 2024-07-21 PROCEDURE — 6360000002 HC RX W HCPCS

## 2024-07-21 PROCEDURE — 93010 ELECTROCARDIOGRAM REPORT: CPT | Performed by: INTERNAL MEDICINE

## 2024-07-21 PROCEDURE — 83735 ASSAY OF MAGNESIUM: CPT

## 2024-07-21 PROCEDURE — 6360000002 HC RX W HCPCS: Performed by: INTERNAL MEDICINE

## 2024-07-21 PROCEDURE — 97116 GAIT TRAINING THERAPY: CPT

## 2024-07-21 PROCEDURE — 99291 CRITICAL CARE FIRST HOUR: CPT | Performed by: PSYCHIATRY & NEUROLOGY

## 2024-07-21 PROCEDURE — APPSS15 APP SPLIT SHARED TIME 0-15 MINUTES

## 2024-07-21 PROCEDURE — 6370000000 HC RX 637 (ALT 250 FOR IP)

## 2024-07-21 PROCEDURE — 84100 ASSAY OF PHOSPHORUS: CPT

## 2024-07-21 PROCEDURE — 70551 MRI BRAIN STEM W/O DYE: CPT

## 2024-07-21 PROCEDURE — 92526 ORAL FUNCTION THERAPY: CPT

## 2024-07-21 PROCEDURE — 2000000000 HC ICU R&B

## 2024-07-21 PROCEDURE — 2580000003 HC RX 258: Performed by: STUDENT IN AN ORGANIZED HEALTH CARE EDUCATION/TRAINING PROGRAM

## 2024-07-21 PROCEDURE — 2580000003 HC RX 258: Performed by: PHYSICIAN ASSISTANT

## 2024-07-21 PROCEDURE — 80061 LIPID PANEL: CPT

## 2024-07-21 PROCEDURE — 94761 N-INVAS EAR/PLS OXIMETRY MLT: CPT

## 2024-07-21 PROCEDURE — 83036 HEMOGLOBIN GLYCOSYLATED A1C: CPT

## 2024-07-21 PROCEDURE — 92610 EVALUATE SWALLOWING FUNCTION: CPT

## 2024-07-21 RX ORDER — AMLODIPINE BESYLATE 10 MG/1
10 TABLET ORAL DAILY
Status: DISCONTINUED | OUTPATIENT
Start: 2024-07-21 | End: 2024-07-22

## 2024-07-21 RX ORDER — LORAZEPAM 2 MG/ML
1 INJECTION INTRAMUSCULAR ONCE
Status: COMPLETED | OUTPATIENT
Start: 2024-07-21 | End: 2024-07-21

## 2024-07-21 RX ADMIN — SODIUM CHLORIDE, PRESERVATIVE FREE 10 ML: 5 INJECTION INTRAVENOUS at 20:34

## 2024-07-21 RX ADMIN — NICARDIPINE HYDROCHLORIDE 2.5 MG/HR: 0.1 INJECTION INTRAVENOUS at 04:25

## 2024-07-21 RX ADMIN — LORAZEPAM 1 MG: 2 INJECTION INTRAMUSCULAR; INTRAVENOUS at 15:49

## 2024-07-21 RX ADMIN — SODIUM CHLORIDE, PRESERVATIVE FREE 10 ML: 5 INJECTION INTRAVENOUS at 20:32

## 2024-07-21 RX ADMIN — AMLODIPINE BESYLATE 10 MG: 10 TABLET ORAL at 08:47

## 2024-07-21 RX ADMIN — ATORVASTATIN CALCIUM 80 MG: 40 TABLET, FILM COATED ORAL at 20:32

## 2024-07-21 RX ADMIN — ASPIRIN 81 MG CHEWABLE TABLET 81 MG: 81 TABLET CHEWABLE at 20:32

## 2024-07-21 RX ADMIN — POTASSIUM BICARBONATE 40 MEQ: 782 TABLET, EFFERVESCENT ORAL at 02:16

## 2024-07-21 RX ADMIN — ENOXAPARIN SODIUM 40 MG: 100 INJECTION SUBCUTANEOUS at 20:31

## 2024-07-21 RX ADMIN — NICARDIPINE HYDROCHLORIDE 5 MG/HR: 0.1 INJECTION INTRAVENOUS at 00:22

## 2024-07-21 NOTE — PLAN OF CARE
Problem: SLP Adult - Impaired Swallowing  Goal: By Discharge: Advance to least restrictive diet without signs or symptoms of aspiration for planned discharge setting.  See evaluation for individualized goals.  Outcome: Progressing  Note: Patient will:  1. Tolerate PO trials with 0 s/s overt distress in 4/5 trials  2. Utilize compensatory swallow strategies/maneuvers (decrease bite/sip, size/rate, alt. liq/sol) with min cues in 4/5 trials      Rec:     Regular diet with thin liquids  Aspiration precautions  HOB >45 during po intake, remain >30 for 30-45 minutes after po   Small bites/sips; alternate liquid/solid with slow feeding rate   Oral care TID  Meds per pt preference     SPEECH LANGUAGE PATHOLOGY BEDSIDE SWALLOW EVALUATION/TREATMENT    Patient: Thanh Hess (58 y.o. male)  Date: 7/21/2024  Primary Diagnosis: Essential hypertension [I10]  Elevated troponin [R79.89]  Suspected cerebrovascular accident (CVA) [R09.89]  Cerebrovascular accident (CVA), unspecified mechanism (HCC) [I63.9]  Stroke aborted by administration of thrombolytic agent (HCC) [I63.9]  Ischemic stroke (HCC) [I63.9]       Precautions: Aspiration  PLOF: As per H&P  ASSESSMENT:  Based on the objective data described below, the patient presents with oropharyngeal swallow essentially WFL. Patient seen today for clinical bedside swallow evaluation s/p TNK for stroke-like symptoms. Patient is alert and oriented x4. He denies new or worsening difficulty swallowing and any changes to speech. Oral mech exam was unremarkable. All structures judged to be functional for mastication and deglutition. Patient observed to tolerate PO trials of thin liquid +/- straw, puree, and regular solid without overt s/sx of aspiration. Mastication and a-p transit were timely and effective, with positive oral clearance observed across all trials. No change to voice quality post swallow. Hyolaryngeal excursion was appreciated to palpation.     Recommend regular diet

## 2024-07-21 NOTE — PLAN OF CARE
Problem: Discharge Planning  Goal: Discharge to home or other facility with appropriate resources  Outcome: Progressing  Flowsheets (Taken 7/21/2024 0800)  Discharge to home or other facility with appropriate resources:   Identify barriers to discharge with patient and caregiver   Arrange for needed discharge resources and transportation as appropriate   Identify discharge learning needs (meds, wound care, etc)   Refer to discharge planning if patient needs post-hospital services based on physician order or complex needs related to functional status, cognitive ability or social support system     Problem: SLP Adult - Impaired Swallowing  Goal: By Discharge: Advance to least restrictive diet without signs or symptoms of aspiration for planned discharge setting.  See evaluation for individualized goals.  7/21/2024 1150 by Mulu Cloud, SLP  Outcome: Progressing  Note: Patient will:  1. Tolerate PO trials with 0 s/s overt distress in 4/5 trials  2. Utilize compensatory swallow strategies/maneuvers (decrease bite/sip, size/rate, alt. liq/sol) with min cues in 4/5 trials      Rec:     Regular diet with thin liquids  Aspiration precautions  HOB >45 during po intake, remain >30 for 30-45 minutes after po   Small bites/sips; alternate liquid/solid with slow feeding rate   Oral care TID  Meds per pt preference       Problem: Pain  Goal: Verbalizes/displays adequate comfort level or baseline comfort level  Outcome: Progressing

## 2024-07-21 NOTE — PLAN OF CARE
Problem: Discharge Planning  Goal: Discharge to home or other facility with appropriate resources  7/21/2024 1544 by Vimal Mac, RN  Outcome: Progressing  7/21/2024 1543 by Vimal Mac, RN  Outcome: Progressing  Flowsheets (Taken 7/21/2024 0800)  Discharge to home or other facility with appropriate resources:   Identify barriers to discharge with patient and caregiver   Arrange for needed discharge resources and transportation as appropriate   Identify discharge learning needs (meds, wound care, etc)   Refer to discharge planning if patient needs post-hospital services based on physician order or complex needs related to functional status, cognitive ability or social support system     Problem: SLP Adult - Impaired Swallowing  Goal: By Discharge: Advance to least restrictive diet without signs or symptoms of aspiration for planned discharge setting.  See evaluation for individualized goals.  7/21/2024 1150 by Mulu Cloud, SLP  Outcome: Progressing  Note: Patient will:  1. Tolerate PO trials with 0 s/s overt distress in 4/5 trials  2. Utilize compensatory swallow strategies/maneuvers (decrease bite/sip, size/rate, alt. liq/sol) with min cues in 4/5 trials      Rec:     Regular diet with thin liquids  Aspiration precautions  HOB >45 during po intake, remain >30 for 30-45 minutes after po   Small bites/sips; alternate liquid/solid with slow feeding rate   Oral care TID  Meds per pt preference

## 2024-07-21 NOTE — CONSULTS
- 2.6 mg/dL   Phosphorus    Collection Time: 07/21/24 12:30 AM   Result Value Ref Range    Phosphorus 2.7 2.5 - 4.9 MG/DL   Lipid Panel    Collection Time: 07/21/24 12:30 AM   Result Value Ref Range    LIPID PANEL        Cholesterol, Total 186 <200 MG/DL    Triglycerides 257 (H) <150 MG/DL    HDL 34 (L) 40 - 60 MG/DL    LDL Cholesterol 100.6 (H) 0 - 100 MG/DL    VLDL Cholesterol Calculated 51.4 MG/DL    Chol/HDL Ratio 5.5 (H) 0 - 5.0     Hemoglobin A1c    Collection Time: 07/21/24 12:30 AM   Result Value Ref Range    Hemoglobin A1C 5.4 4.2 - 5.6 %    Estimated Avg Glucose 108 mg/dL     Encounter Date: 07/20/24   EKG 12 Lead   Result Value    Ventricular Rate 46    Atrial Rate 46    P-R Interval 188    QRS Duration 108    Q-T Interval 496    QTc Calculation (Bazett) 434    P Axis 35    R Axis -25    T Axis -41    Diagnosis      Marked sinus bradycardia  Voltage criteria for left ventricular hypertrophy  T wave abnormality, consider anterolateral ischemia  Abnormal ECG  When compared with ECG of 17-MAR-2020 08:47,  T wave inversion more evident in Inferior leads  T wave inversion now evident in Anterolateral leads  Confirmed by MD Dominguez, Michael ([id number]) on 7/21/2024 8:23:38 AM         XR CHEST PORTABLE  Impression: Stable chest. No acute cardiopulmonary pathology.    Electronically signed by Caden Cabezas  CTA HEAD NECK W CONTRAST  PRELIMINARY REPORT    CTA HEAD AND NECK  CODE S STROKE ALERT    PRELIMINARY FINDINGS:  -PATENT CERVICAL CAROTID AND VERTEBRAL ARTERIES, NO SIGNIFICANT STENOSIS.    -PATENT INTRACRANIAL CIRCULATION. NO LARGE VESSEL OCCLUSION.  -Patient has multifocal intracranial atherosclerotic stenoses without branch  occlusion.  --Moderate stenosis mid basilar artery. Additional stenosis in the left greater  than right posterior cerebral arteries.  --Moderate to high-grade stenosis in the paraclinoid RIGHT ICA with patent  distal vessel.  --Moderate distal left ICA stenosis.  --Bilateral OFELIA and MCA

## 2024-07-22 ENCOUNTER — APPOINTMENT (OUTPATIENT)
Facility: HOSPITAL | Age: 59
DRG: 062 | End: 2024-07-22

## 2024-07-22 LAB
ANION GAP SERPL CALC-SCNC: 6 MMOL/L (ref 3–18)
BASOPHILS # BLD: 0 K/UL (ref 0–0.1)
BASOPHILS NFR BLD: 1 % (ref 0–2)
BUN SERPL-MCNC: 15 MG/DL (ref 7–18)
BUN/CREAT SERPL: 14 (ref 12–20)
CALCIUM SERPL-MCNC: 8.7 MG/DL (ref 8.5–10.1)
CHLORIDE SERPL-SCNC: 110 MMOL/L (ref 100–111)
CO2 SERPL-SCNC: 24 MMOL/L (ref 21–32)
CREAT SERPL-MCNC: 1.09 MG/DL (ref 0.6–1.3)
DIFFERENTIAL METHOD BLD: ABNORMAL
ECHO AO ASC DIAM: 3.6 CM
ECHO AO ASCENDING AORTA INDEX: 1.76 CM/M2
ECHO AO ROOT DIAM: 3.5 CM
ECHO AO ROOT INDEX: 1.72 CM/M2
ECHO AV PEAK GRADIENT: 12 MMHG
ECHO AV PEAK VELOCITY: 1.8 M/S
ECHO BSA: 2.08 M2
ECHO LA DIAMETER INDEX: 2.01 CM/M2
ECHO LA DIAMETER: 4.1 CM
ECHO LA TO AORTIC ROOT RATIO: 1.17
ECHO LA VOL A-L A2C: 83 ML (ref 18–58)
ECHO LA VOL A-L A4C: 58 ML (ref 18–58)
ECHO LA VOL BP: 65 ML (ref 18–58)
ECHO LA VOL MOD A2C: 73 ML (ref 18–58)
ECHO LA VOL MOD A4C: 54 ML (ref 18–58)
ECHO LA VOL/BSA BIPLANE: 32 ML/M2 (ref 16–34)
ECHO LA VOLUME AREA LENGTH: 72 ML
ECHO LA VOLUME INDEX A-L A2C: 41 ML/M2 (ref 16–34)
ECHO LA VOLUME INDEX A-L A4C: 28 ML/M2 (ref 16–34)
ECHO LA VOLUME INDEX AREA LENGTH: 35 ML/M2 (ref 16–34)
ECHO LA VOLUME INDEX MOD A2C: 36 ML/M2 (ref 16–34)
ECHO LA VOLUME INDEX MOD A4C: 26 ML/M2 (ref 16–34)
ECHO LV E' LATERAL VELOCITY: 3 CM/S
ECHO LV E' SEPTAL VELOCITY: 4 CM/S
ECHO LV FRACTIONAL SHORTENING: 20 % (ref 28–44)
ECHO LV INTERNAL DIMENSION DIASTOLE INDEX: 2.5 CM/M2
ECHO LV INTERNAL DIMENSION DIASTOLIC: 5.1 CM (ref 4.2–5.9)
ECHO LV INTERNAL DIMENSION SYSTOLIC INDEX: 2.01 CM/M2
ECHO LV INTERNAL DIMENSION SYSTOLIC: 4.1 CM
ECHO LV IVSD: 1.1 CM (ref 0.6–1)
ECHO LV MASS 2D: 213.9 G (ref 88–224)
ECHO LV MASS INDEX 2D: 104.9 G/M2 (ref 49–115)
ECHO LV POSTERIOR WALL DIASTOLIC: 1.1 CM (ref 0.6–1)
ECHO LV RELATIVE WALL THICKNESS RATIO: 0.43
ECHO LVOT AREA: 4.9 CM2
ECHO LVOT DIAM: 2.5 CM
ECHO MV A VELOCITY: 0.83 M/S
ECHO MV E DECELERATION TIME (DT): 197.4 MS
ECHO MV E VELOCITY: 0.34 M/S
ECHO MV E/A RATIO: 0.41
ECHO MV E/E' LATERAL: 11.33
ECHO MV E/E' RATIO (AVERAGED): 9.92
ECHO MV E/E' SEPTAL: 8.5
ECHO PV MAX VELOCITY: 1 M/S
ECHO PV PEAK GRADIENT: 4 MMHG
ECHO RA VOLUME BIPLANE METHOD OF DISKS: 34 ML
ECHO RA VOLUME INDEX BP: 17 ML/M2
ECHO RA VOLUME: 34 ML
ECHO RA VOLUME: 36 ML
ECHO RV BASAL DIMENSION: 3.9 CM
ECHO RV TAPSE: 2 CM (ref 1.7–?)
EOSINOPHIL # BLD: 0.2 K/UL (ref 0–0.4)
EOSINOPHIL NFR BLD: 4 % (ref 0–5)
ERYTHROCYTE [DISTWIDTH] IN BLOOD BY AUTOMATED COUNT: 14.8 % (ref 11.6–14.5)
GLUCOSE SERPL-MCNC: 99 MG/DL (ref 74–99)
HCT VFR BLD AUTO: 41.8 % (ref 36–48)
HGB BLD-MCNC: 13.7 G/DL (ref 13–16)
IMM GRANULOCYTES # BLD AUTO: 0 K/UL (ref 0–0.04)
IMM GRANULOCYTES NFR BLD AUTO: 0 % (ref 0–0.5)
LYMPHOCYTES # BLD: 1.6 K/UL (ref 0.9–3.6)
LYMPHOCYTES NFR BLD: 32 % (ref 21–52)
MAGNESIUM SERPL-MCNC: 2.2 MG/DL (ref 1.6–2.6)
MCH RBC QN AUTO: 27.8 PG (ref 24–34)
MCHC RBC AUTO-ENTMCNC: 32.8 G/DL (ref 31–37)
MCV RBC AUTO: 84.8 FL (ref 78–100)
MONOCYTES # BLD: 0.3 K/UL (ref 0.05–1.2)
MONOCYTES NFR BLD: 6 % (ref 3–10)
NEUTS SEG # BLD: 2.8 K/UL (ref 1.8–8)
NEUTS SEG NFR BLD: 57 % (ref 40–73)
NRBC # BLD: 0 K/UL (ref 0–0.01)
NRBC BLD-RTO: 0 PER 100 WBC
PHOSPHATE SERPL-MCNC: 3.5 MG/DL (ref 2.5–4.9)
PLATELET # BLD AUTO: 244 K/UL (ref 135–420)
PMV BLD AUTO: 9.4 FL (ref 9.2–11.8)
POTASSIUM SERPL-SCNC: 3.8 MMOL/L (ref 3.5–5.5)
RBC # BLD AUTO: 4.93 M/UL (ref 4.35–5.65)
SODIUM SERPL-SCNC: 140 MMOL/L (ref 136–145)
WBC # BLD AUTO: 5 K/UL (ref 4.6–13.2)

## 2024-07-22 PROCEDURE — 6370000000 HC RX 637 (ALT 250 FOR IP): Performed by: INTERNAL MEDICINE

## 2024-07-22 PROCEDURE — APPSS15 APP SPLIT SHARED TIME 0-15 MINUTES

## 2024-07-22 PROCEDURE — 2580000003 HC RX 258: Performed by: PHYSICIAN ASSISTANT

## 2024-07-22 PROCEDURE — 83735 ASSAY OF MAGNESIUM: CPT

## 2024-07-22 PROCEDURE — 6360000002 HC RX W HCPCS

## 2024-07-22 PROCEDURE — 80048 BASIC METABOLIC PNL TOTAL CA: CPT

## 2024-07-22 PROCEDURE — 2000000000 HC ICU R&B

## 2024-07-22 PROCEDURE — 94761 N-INVAS EAR/PLS OXIMETRY MLT: CPT

## 2024-07-22 PROCEDURE — 93306 TTE W/DOPPLER COMPLETE: CPT | Performed by: INTERNAL MEDICINE

## 2024-07-22 PROCEDURE — 93306 TTE W/DOPPLER COMPLETE: CPT

## 2024-07-22 PROCEDURE — 84100 ASSAY OF PHOSPHORUS: CPT

## 2024-07-22 PROCEDURE — 6370000000 HC RX 637 (ALT 250 FOR IP)

## 2024-07-22 PROCEDURE — 99232 SBSQ HOSP IP/OBS MODERATE 35: CPT | Performed by: HOSPITALIST

## 2024-07-22 PROCEDURE — 36415 COLL VENOUS BLD VENIPUNCTURE: CPT

## 2024-07-22 PROCEDURE — 85025 COMPLETE CBC W/AUTO DIFF WBC: CPT

## 2024-07-22 PROCEDURE — 6360000002 HC RX W HCPCS: Performed by: INTERNAL MEDICINE

## 2024-07-22 PROCEDURE — 2580000003 HC RX 258: Performed by: STUDENT IN AN ORGANIZED HEALTH CARE EDUCATION/TRAINING PROGRAM

## 2024-07-22 PROCEDURE — 99291 CRITICAL CARE FIRST HOUR: CPT | Performed by: INTERNAL MEDICINE

## 2024-07-22 RX ORDER — FAMOTIDINE 20 MG/1
20 TABLET, FILM COATED ORAL 2 TIMES DAILY
Status: DISCONTINUED | OUTPATIENT
Start: 2024-07-22 | End: 2024-07-23 | Stop reason: HOSPADM

## 2024-07-22 RX ORDER — HEPARIN SODIUM 5000 [USP'U]/ML
5000 INJECTION, SOLUTION INTRAVENOUS; SUBCUTANEOUS EVERY 8 HOURS SCHEDULED
Status: DISCONTINUED | OUTPATIENT
Start: 2024-07-22 | End: 2024-07-22

## 2024-07-22 RX ORDER — HYDRALAZINE HYDROCHLORIDE 20 MG/ML
10 INJECTION INTRAMUSCULAR; INTRAVENOUS EVERY 6 HOURS
Status: DISCONTINUED | OUTPATIENT
Start: 2024-07-22 | End: 2024-07-23 | Stop reason: HOSPADM

## 2024-07-22 RX ORDER — AMLODIPINE BESYLATE 5 MG/1
5 TABLET ORAL DAILY
Status: DISCONTINUED | OUTPATIENT
Start: 2024-07-22 | End: 2024-07-23 | Stop reason: HOSPADM

## 2024-07-22 RX ORDER — LISINOPRIL 20 MG/1
20 TABLET ORAL DAILY
Status: DISCONTINUED | OUTPATIENT
Start: 2024-07-22 | End: 2024-07-23 | Stop reason: HOSPADM

## 2024-07-22 RX ADMIN — LISINOPRIL 20 MG: 20 TABLET ORAL at 12:04

## 2024-07-22 RX ADMIN — SODIUM CHLORIDE, PRESERVATIVE FREE 10 ML: 5 INJECTION INTRAVENOUS at 08:01

## 2024-07-22 RX ADMIN — FAMOTIDINE 20 MG: 20 TABLET ORAL at 20:24

## 2024-07-22 RX ADMIN — ASPIRIN 81 MG CHEWABLE TABLET 81 MG: 81 TABLET CHEWABLE at 07:57

## 2024-07-22 RX ADMIN — ENOXAPARIN SODIUM 40 MG: 100 INJECTION SUBCUTANEOUS at 20:25

## 2024-07-22 RX ADMIN — SODIUM CHLORIDE, PRESERVATIVE FREE 10 ML: 5 INJECTION INTRAVENOUS at 20:26

## 2024-07-22 RX ADMIN — ATORVASTATIN CALCIUM 80 MG: 40 TABLET, FILM COATED ORAL at 20:24

## 2024-07-22 RX ADMIN — SODIUM CHLORIDE, PRESERVATIVE FREE 10 ML: 5 INJECTION INTRAVENOUS at 20:25

## 2024-07-22 RX ADMIN — SODIUM CHLORIDE, PRESERVATIVE FREE 10 ML: 5 INJECTION INTRAVENOUS at 08:00

## 2024-07-22 RX ADMIN — FAMOTIDINE 20 MG: 20 TABLET ORAL at 12:04

## 2024-07-22 RX ADMIN — HYDRALAZINE HYDROCHLORIDE 10 MG: 20 INJECTION INTRAMUSCULAR; INTRAVENOUS at 22:09

## 2024-07-22 RX ADMIN — HYDRALAZINE HYDROCHLORIDE 10 MG: 20 INJECTION INTRAMUSCULAR; INTRAVENOUS at 16:31

## 2024-07-22 RX ADMIN — HYDRALAZINE HYDROCHLORIDE 10 MG: 20 INJECTION INTRAMUSCULAR; INTRAVENOUS at 10:30

## 2024-07-22 RX ADMIN — AMLODIPINE BESYLATE 5 MG: 5 TABLET ORAL at 07:57

## 2024-07-22 NOTE — PLAN OF CARE
Problem: Discharge Planning  Goal: Discharge to home or other facility with appropriate resources  Outcome: Progressing  Flowsheets (Taken 7/22/2024 0800)  Discharge to home or other facility with appropriate resources:   Identify barriers to discharge with patient and caregiver   Arrange for needed discharge resources and transportation as appropriate   Identify discharge learning needs (meds, wound care, etc)   Refer to discharge planning if patient needs post-hospital services based on physician order or complex needs related to functional status, cognitive ability or social support system     Problem: Pain  Goal: Verbalizes/displays adequate comfort level or baseline comfort level  Outcome: HH/HSPC Resolved Met  Flowsheets  Taken 7/22/2024 0800 by Vimal Mac RN  Verbalizes/displays adequate comfort level or baseline comfort level:   Encourage patient to monitor pain and request assistance   Assess pain using appropriate pain scale   Administer analgesics based on type and severity of pain and evaluate response   Implement non-pharmacological measures as appropriate and evaluate response   Consider cultural and social influences on pain and pain management   Notify Licensed Independent Practitioner if interventions unsuccessful or patient reports new pain  Taken 7/22/2024 0400 by Stephanie Torres RN  Verbalizes/displays adequate comfort level or baseline comfort level:   Encourage patient to monitor pain and request assistance   Assess pain using appropriate pain scale   Administer analgesics based on type and severity of pain and evaluate response   Consider cultural and social influences on pain and pain management   Implement non-pharmacological measures as appropriate and evaluate response   Notify Licensed Independent Practitioner if interventions unsuccessful or patient reports new pain  Taken 7/21/2024 2000 by Stephanie Torres, RN  Verbalizes/displays adequate comfort level or baseline comfort

## 2024-07-22 NOTE — INTERDISCIPLINARY ROUNDS
[facility]  Pulmonary, Critical Care, and Sleep Medicine  Interdisciplinary and Ventilator Weaning Rounds    Patient discussed in morning walking rounds and interdisciplinary rounds.    ICU admission day: 07/20    Overnight events:   No acute events,  Receiving oral BP meds  Completed MRI      Assessments and best practice:  Ventilator     N/a  Glycemic control  Diet  ADULT DIET; Regular  Stress ulcer prophylaxis.  Pepcid  DVT prophylaxis.  SQH  Need for Lines, lynn assessed.  Yes  Restraint Reevaluation   Nor required  Disposition regarding transferring out of the ICU  GREEN      Family contact/MPOA:   Family updated by ICU team    Palliative consult within 3 days of admission to ICU-  Ethics Guidance: 21 days      Daily Plans:  Schedule hydralazine q6h  Add lisinopril    KAT time 10 minutes        Kendall Bundy PA-C  07/22/24  Pulmonary, Critical Care Medicine  [facility]

## 2024-07-23 VITALS
SYSTOLIC BLOOD PRESSURE: 164 MMHG | DIASTOLIC BLOOD PRESSURE: 99 MMHG | WEIGHT: 198 LBS | TEMPERATURE: 98.4 F | BODY MASS INDEX: 30.01 KG/M2 | HEART RATE: 76 BPM | RESPIRATION RATE: 16 BRPM | OXYGEN SATURATION: 97 % | HEIGHT: 68 IN

## 2024-07-23 LAB
ANION GAP SERPL CALC-SCNC: 9 MMOL/L (ref 3–18)
BASOPHILS # BLD: 0 K/UL (ref 0–0.1)
BASOPHILS NFR BLD: 1 % (ref 0–2)
BUN SERPL-MCNC: 16 MG/DL (ref 7–18)
BUN/CREAT SERPL: 14 (ref 12–20)
CALCIUM SERPL-MCNC: 9.2 MG/DL (ref 8.5–10.1)
CHLORIDE SERPL-SCNC: 109 MMOL/L (ref 100–111)
CO2 SERPL-SCNC: 19 MMOL/L (ref 21–32)
CREAT SERPL-MCNC: 1.13 MG/DL (ref 0.6–1.3)
DIFFERENTIAL METHOD BLD: ABNORMAL
EOSINOPHIL # BLD: 0.3 K/UL (ref 0–0.4)
EOSINOPHIL NFR BLD: 5 % (ref 0–5)
ERYTHROCYTE [DISTWIDTH] IN BLOOD BY AUTOMATED COUNT: 14.9 % (ref 11.6–14.5)
GLUCOSE SERPL-MCNC: 105 MG/DL (ref 74–99)
HCT VFR BLD AUTO: 45 % (ref 36–48)
HGB BLD-MCNC: 15.1 G/DL (ref 13–16)
IMM GRANULOCYTES # BLD AUTO: 0 K/UL (ref 0–0.04)
IMM GRANULOCYTES NFR BLD AUTO: 0 % (ref 0–0.5)
LYMPHOCYTES # BLD: 1.5 K/UL (ref 0.9–3.6)
LYMPHOCYTES NFR BLD: 26 % (ref 21–52)
MAGNESIUM SERPL-MCNC: 2.2 MG/DL (ref 1.6–2.6)
MCH RBC QN AUTO: 28.3 PG (ref 24–34)
MCHC RBC AUTO-ENTMCNC: 33.6 G/DL (ref 31–37)
MCV RBC AUTO: 84.3 FL (ref 78–100)
MONOCYTES # BLD: 0.4 K/UL (ref 0.05–1.2)
MONOCYTES NFR BLD: 6 % (ref 3–10)
NEUTS SEG # BLD: 3.8 K/UL (ref 1.8–8)
NEUTS SEG NFR BLD: 62 % (ref 40–73)
NRBC # BLD: 0 K/UL (ref 0–0.01)
NRBC BLD-RTO: 0 PER 100 WBC
PHOSPHATE SERPL-MCNC: 3 MG/DL (ref 2.5–4.9)
PLATELET # BLD AUTO: 307 K/UL (ref 135–420)
PMV BLD AUTO: 9.8 FL (ref 9.2–11.8)
POTASSIUM SERPL-SCNC: 3.5 MMOL/L (ref 3.5–5.5)
RBC # BLD AUTO: 5.34 M/UL (ref 4.35–5.65)
SODIUM SERPL-SCNC: 137 MMOL/L (ref 136–145)
WBC # BLD AUTO: 6 K/UL (ref 4.6–13.2)

## 2024-07-23 PROCEDURE — 85025 COMPLETE CBC W/AUTO DIFF WBC: CPT

## 2024-07-23 PROCEDURE — 94761 N-INVAS EAR/PLS OXIMETRY MLT: CPT

## 2024-07-23 PROCEDURE — 80048 BASIC METABOLIC PNL TOTAL CA: CPT

## 2024-07-23 PROCEDURE — 6360000002 HC RX W HCPCS: Performed by: INTERNAL MEDICINE

## 2024-07-23 PROCEDURE — 6370000000 HC RX 637 (ALT 250 FOR IP)

## 2024-07-23 PROCEDURE — 6370000000 HC RX 637 (ALT 250 FOR IP): Performed by: INTERNAL MEDICINE

## 2024-07-23 PROCEDURE — 84100 ASSAY OF PHOSPHORUS: CPT

## 2024-07-23 PROCEDURE — 99239 HOSP IP/OBS DSCHRG MGMT >30: CPT | Performed by: HOSPITALIST

## 2024-07-23 PROCEDURE — 36415 COLL VENOUS BLD VENIPUNCTURE: CPT

## 2024-07-23 PROCEDURE — 2580000003 HC RX 258: Performed by: STUDENT IN AN ORGANIZED HEALTH CARE EDUCATION/TRAINING PROGRAM

## 2024-07-23 PROCEDURE — 83735 ASSAY OF MAGNESIUM: CPT

## 2024-07-23 PROCEDURE — 2580000003 HC RX 258: Performed by: PHYSICIAN ASSISTANT

## 2024-07-23 RX ORDER — HYDRALAZINE HYDROCHLORIDE 25 MG/1
25 TABLET, FILM COATED ORAL 3 TIMES DAILY
Qty: 90 TABLET | Refills: 0 | Status: SHIPPED | OUTPATIENT
Start: 2024-07-23

## 2024-07-23 RX ORDER — LISINOPRIL 40 MG/1
40 TABLET ORAL DAILY
Qty: 90 TABLET | Refills: 0 | Status: SHIPPED | OUTPATIENT
Start: 2024-07-23

## 2024-07-23 RX ORDER — AMLODIPINE BESYLATE 10 MG/1
10 TABLET ORAL DAILY
Qty: 90 TABLET | Refills: 0 | Status: SHIPPED | OUTPATIENT
Start: 2024-07-23

## 2024-07-23 RX ORDER — ATORVASTATIN CALCIUM 40 MG/1
40 TABLET, FILM COATED ORAL DAILY
Qty: 30 TABLET | Refills: 0 | Status: SHIPPED | OUTPATIENT
Start: 2024-07-23

## 2024-07-23 RX ADMIN — HYDRALAZINE HYDROCHLORIDE 10 MG: 20 INJECTION INTRAMUSCULAR; INTRAVENOUS at 03:50

## 2024-07-23 RX ADMIN — SODIUM CHLORIDE, PRESERVATIVE FREE 10 ML: 5 INJECTION INTRAVENOUS at 08:19

## 2024-07-23 RX ADMIN — FAMOTIDINE 20 MG: 20 TABLET ORAL at 08:15

## 2024-07-23 RX ADMIN — LISINOPRIL 20 MG: 20 TABLET ORAL at 08:15

## 2024-07-23 RX ADMIN — HYDRALAZINE HYDROCHLORIDE 10 MG: 20 INJECTION INTRAMUSCULAR; INTRAVENOUS at 08:15

## 2024-07-23 RX ADMIN — AMLODIPINE BESYLATE 5 MG: 5 TABLET ORAL at 08:15

## 2024-07-23 RX ADMIN — ASPIRIN 81 MG CHEWABLE TABLET 81 MG: 81 TABLET CHEWABLE at 08:15

## 2024-07-23 NOTE — CARE COORDINATION
CMIA completed, pt doesn't have any insurance on file, pt reports having [place] HMO through his job then reported they let him go a while back.     CM explained to  patient that commercial policy through a company usually ends if your no longer employed with the company.   CM advised that hospital able to provide indigent meds for patients without insurance, and will usually be about an hour to process, pt replied he can't wait that long, cm informed him he can pick the meds up at a later time, he replied \" no, I had the Dr send the medication to my pharmacy on [address] and that's where I have been getting my medications, so I will go there and get this one.\"   CM confirmed with patient that he has his other meds at home and he said he will get the hydralazine at his pharmacy when he leaves.    Wife to transport home.        07/23/24 1129   Service Assessment   Patient Orientation Person;Place;Alert and Oriented;Situation;Self   Cognition Alert   History Provided By Patient   Primary Caregiver Self   Support Systems Spouse/Significant Other;Children   PCP Verified by CM Yes   Last Visit to PCP Within last year   Prior Functional Level Independent in ADLs/IADLs   Current Functional Level Independent in ADLs/IADLs   Can patient return to prior living arrangement Yes   Ability to make needs known: Good   Family able to assist with home care needs: Yes   Would you like for me to discuss the discharge plan with any other family members/significant others, and if so, who? No   Social/Functional History   Lives With Spouse   Type of Home Apartment   Home Layout One level   Home Access Level entry   Bathroom Toilet Standard   Home Equipment Cane   ADL Assistance Independent   Homemaking Assistance Independent   Ambulation Assistance Independent   Transfer Assistance Independent   Occupation Unemployed   Discharge Planning   Living Arrangements Children  (daughter)   Current Services Prior To Admission None   Potential

## 2024-07-23 NOTE — PROGRESS NOTES
17:50 - received patient from ER, bedside handoff completed with transporting nurse.  Handoff completed with cardene drip.  18:00 - NIHSS completed, patient remains a score of 0.  18:05 - patient voided clear yellow urine, set up to eat dinner, passed swallow screen in ER.  18:45 - patient see by PA, update provided.  19:20 - report given to Rob SMITH  
Advance Care Planning     Advance Care Planning Inpatient Note  [facility] Department    Today's Date: 7/22/2024  Unit: [facility] 3 INTENSIVE CARE UNIT    Received request from HealthCare Provider.  Upon review of chart and communication with care team, patient's decision making abilities are not in question.. Patient was/were present in the room during visit.    Goals of ACP Conversation:  Discuss advance care planning documents    Health Care Decision Makers:       Primary Decision Maker: João Hess - Spouse - [phone]    Secondary Decision Maker: Angeles Hess - Child - [phone]  Summary:  Completed New Documents    Advance Care Planning Documents (Patient Wishes):  Healthcare Power of /Advance Directive Appointment of Health Care Agent     Assessment:  Patient alert and calm. Hopeful for a speedy recovery. Discuss advance directive with patient and patient completed Part I, power of  for healthcare.    Interventions:  Provided education on documents for clarity and greater understanding  Assisted in the completion of documents according to patient's wishes at this time    Care Preferences Communicated:   No    Outcomes/Plan:  ACP Discussion: Completed  New advance directive completed.  Returned original document(s) to patient, as well as copies for distribution to appointed agents  Copy of advance directive given to staff to scan into medical record.    Electronically signed by Chaplain Desi on 7/22/2024 at 12:52 PM            
Advance Care Planning   Healthcare Decision Maker:    Primary Decision Maker: João Hess - Spouse - [phone]    Secondary Decision Maker: Angeles Hess - Child - [phone]    Click here to complete Healthcare Decision Makers including selection of the Healthcare Decision Maker Relationship (ie \"Primary\").       met with Thanh Hess, who is a 58 y.o.,male to assist in the completion of a [place] Advance Directive For Health Care.     Patient’s Primary Language is: English.     The reason the Patient was admitted to is:   Patient Active Problem List    Diagnosis Date Noted    Cerebrovascular accident (CVA) (HCC) 07/21/2024    Suspected cerebrovascular accident (CVA) 07/20/2024    Stroke aborted by administration of thrombolytic agent (HCC) 07/20/2024    Ischemic stroke (HCC) 07/20/2024    Advance directive discussed with patient 10/06/2016    Arthritis of knee 06/01/2012    Essential hypertension 12/01/2011    Pure hypercholesterolemia 12/01/2011        The  provided the following Interventions:  Chart reviewed.  Initiated a relationship of care and support.   Explored mental status and with the patient's Nurse (*) who confirmed that the Patient was not on any medications that would effect the patients ability to, nor would the patient's mental status effect the ability to execute an Advance Medical Directive.  Questioned patient as to date, location, and name.  Provided education on the [place] Advance Directive For Health Care.  Offered prayer and assurance of continued prayers on patients behalf.   Placed a copy of the executed [place] Advance Directive For Health Care in the patients paper chart and returned the original to the patient.    The following outcomes were achieved:  Patient was able to state date, location, and name.  Patient executed a [place] Advance Directive For Health Care, completing Section I:    Appointment and [place] of My Agent  Patient expressed gratitude for 
Assumed care of pt from Erinn SMITH  Initiated handoff and verified drip.  Introduced myself to pt, and updated whiteboard.   Went over plan of care for the evening and goals with pt.  Call bell within reach, pt has no requests at this time.      
Collected pt labs, requested  for specimens.  Completed NIH and neuro check for pt.  NAD at this time.  Call bell within reach.   Pt has no requests at this time.  
[facility].  Pulmonary, Critical Care, and Sleep Medicine    Name: Thanh Hess MRN: [id number]   : 1965 Hospital: [facility]   Date: 2024  Admission Date: 2024     Chart and notes reviewed. Data reviewed. I have evaluated all findings.    [x]I have reviewed the flowsheet and previous day’s notes.        [x]The patient is critically ill on      []Mechanical ventilation []Pressors   []BiPAP [x]Post TNK         Interval HPI:  Patient is a 58 y.o. male past medical history of high blood pressure high cholesterol states that he has had a cardiac stent a couple years ago no other significant medical history states that he presented at a doctor's office today for a scheduled doctor's appointment for disability and his blood pressure was extremely high and was sent to the emergency room for further evaluation.  Patient stated that he started to have headache with some blurred vision question of right-sided facial droop and left-sided upper and lower extremity weakness.  In the ER patient was extremely hypertensive started on a Cardene drip stroke workup was initiated and patient was deemed a candidate for TNK given at 1207 on  with full resolution of symptoms.     Subjective 24  Hospital Day: 2  Vent Day:n/a  Overnight events: no acute events overnight  Mentation/Activity: AAOx4, no neurological deficits  Respiratory/ Secretions: on room air  Hemodynamics: stable, off Cardene gtt  Urine output, bowel: see Is/Os  Diet: regular  Need for procedures: none              ROS:Pertinent items are noted in HPI.    Events, medications, imaging, and notes from last 24 hours reviewed.     Patient Active Problem List   Diagnosis    Advance directive discussed with patient    Essential hypertension    Arthritis of knee    Pure hypercholesterolemia    Suspected cerebrovascular accident (CVA)    Stroke aborted by administration of thrombolytic agent (HCC)    Ischemic 
MRI screening form needs to be filled out and faxed to [phone] BEFORE MRI can be scheduled. If unable to obtain information from the patient, [facility] needs to be contacted. If patient is claustrophobic or will need pain meds, please have ordered in advance in order to facilitate exam.  
Occupational Therapy  OT order received and chart reviewed.  Spoke with patient in his room and he declined need for skilled OT at this time. Patient is modified independent with basic self care tasks and functional mobility. He has assistance/support from wife and daughter at home.  No skilled OT indicated at this time; will complete the order.      Thank you for the referral.   Brooklyn FRANZ, OTR/L  
Patient seen evaluated, lying in bed, no acute distress.  Blood pressure is better controlled.  Will discharge patient home today with p.o. medications.  Patient has been advised to be compliant with his blood pressure medications and follow-up with PCP in 2 weeks.  Patient verbalized understanding.  
Physical Therapy  PHYSICAL THERAPY EVALUATION/DISCHARGE    Patient: Thanh Hess (58 y.o. male)  Date: 7/21/2024  Primary Diagnosis: Essential hypertension [I10]  Elevated troponin [R79.89]  Suspected cerebrovascular accident (CVA) [R09.89]  Cerebrovascular accident (CVA), unspecified mechanism (HCC) [I63.9]  Stroke aborted by administration of thrombolytic agent (HCC) [I63.9]  Ischemic stroke (HCC) [I63.9]       Precautions: General Precautions,  ,  ,  ,  ,  ,  ,    PLOF: Lives with spouse in a 1 story apartment with level entry. Ind prior to admission.     ASSESSMENT AND RECOMMENDATIONS:  Pt cleared by nursing to participate in PT evaluation. Pt educated on PT role and evaluation process and pt agreeable. Pt mod I to EOB and denies and SOB, lightheadedness or dizziness. Demonstrates 5/5 BLE for knee extension, hip flexion, ankle DF/PF and intact sensation. Intact HTS and FTN. Sit to stand mod I with slight increase postural sway. Initially ambulates within room with RW and progresses to no AD and demonstrates good safety awareness and decision making. Pt returns to EOB and supine. Pt left with all needs and call bell within reach. Pt presents at functional baseline and skilled acute care PT is not indicated at this time, will sign off.     Patient does not require further skilled physical therapy intervention at this level of care.    Further Equipment Recommendations for Discharge:  owns appropriate DME    [facility]: AM-PAC Inpatient Mobility Raw Score : 23      At this time and based on an AM-PAC score, no further PT is recommended upon discharge.  Recommend patient returns to prior setting with prior services.    This [facility] score should be considered in conjunction with interdisciplinary team recommendations to determine the most appropriate discharge setting. Patient's social support, diagnosis, medical stability, and prior level of function should also be taken into consideration.     SUBJECTIVE:   Patient 
MG/DL    LDL Cholesterol 100.6 (H) 0 - 100 MG/DL    VLDL Cholesterol Calculated 51.4 MG/DL    Chol/HDL Ratio 5.5 (H) 0 - 5.0     Hemoglobin A1c    Collection Time: 07/21/24 12:30 AM   Result Value Ref Range    Hemoglobin A1C 5.4 4.2 - 5.6 %    Estimated Avg Glucose 108 mg/dL   Basic Metabolic Panel w/ Reflex to MG    Collection Time: 07/22/24  3:17 AM   Result Value Ref Range    Sodium 140 136 - 145 mmol/L    Potassium 3.8 3.5 - 5.5 mmol/L    Chloride 110 100 - 111 mmol/L    CO2 24 21 - 32 mmol/L    Anion Gap 6 3.0 - 18 mmol/L    Glucose 99 74 - 99 mg/dL    BUN 15 7.0 - 18 MG/DL    Creatinine 1.09 0.6 - 1.3 MG/DL    BUN/Creatinine Ratio 14 12 - 20      Est, Glom Filt Rate 79 >60 ml/min/1.73m2    Calcium 8.7 8.5 - 10.1 MG/DL   CBC with Auto Differential    Collection Time: 07/22/24  3:17 AM   Result Value Ref Range    WBC 5.0 4.6 - 13.2 K/uL    RBC 4.93 4.35 - 5.65 M/uL    Hemoglobin 13.7 13.0 - 16.0 g/dL    Hematocrit 41.8 36.0 - 48.0 %    MCV 84.8 78.0 - 100.0 FL    MCH 27.8 24.0 - 34.0 PG    MCHC 32.8 31.0 - 37.0 g/dL    RDW 14.8 (H) 11.6 - 14.5 %    Platelets 244 135 - 420 K/uL    MPV 9.4 9.2 - 11.8 FL    Nucleated RBCs 0.0 0  WBC    nRBC 0.00 0.00 - 0.01 K/uL    Neutrophils % 57 40 - 73 %    Lymphocytes % 32 21 - 52 %    Monocytes % 6 3 - 10 %    Eosinophils % 4 0 - 5 %    Basophils % 1 0 - 2 %    Immature Granulocytes % 0 0.0 - 0.5 %    Neutrophils Absolute 2.8 1.8 - 8.0 K/UL    Lymphocytes Absolute 1.6 0.9 - 3.6 K/UL    Monocytes Absolute 0.3 0.05 - 1.2 K/UL    Eosinophils Absolute 0.2 0.0 - 0.4 K/UL    Basophils Absolute 0.0 0.0 - 0.1 K/UL    Immature Granulocytes Absolute 0.0 0.00 - 0.04 K/UL    Differential Type AUTOMATED     Magnesium    Collection Time: 07/22/24  3:17 AM   Result Value Ref Range    Magnesium 2.2 1.6 - 2.6 mg/dL   Phosphorus    Collection Time: 07/22/24  3:17 AM   Result Value Ref Range    Phosphorus 3.5 2.5 - 4.9 MG/DL   Echo (TTE) complete (PRN contrast/bubble/strain/3D)    
cerebrovascular accident (CVA)    Stroke aborted by administration of thrombolytic agent (HCC)    Ischemic stroke (HCC)        RECOMMENDATIONS:   Neuro: NIHSS neuro checks. Repeat CT head today at 1300. Stroke bundle.  Pulm: Supplemental O2 via NC, titrate flow for goal SPO2> 90% pulmonary hygiene care, Aspiration precautions, Keep HOB >30 degrees  CVS : wean cardene gtt, restart home dose Norvasc 10mg daily.    Fluids: None  GI: SUP, Trend LFTs, Zofran PRN for N/V, regular diet  Renal:  Trend Renal indices, Strict Is/Os,   Hem/Onc: Monitor for s/o active bleeding. Avoid needle stick for 24 hrs post TNK. Lovenox, aspirin 24 hrs psot TNK if no bleed evident on repeat CT head.  I/D: No infectious etiology watch trend temperature and WBC curve.  Endocrine: Q6 glucoses, SSI.   Metabolic:  Daily BMP, mag, phos. Trend lytes, replace as needed.   Musc/Skin: no acute issues, wound care          Best practice :    Glycemic control    DVT prophylaxis.   Not indicated s/p TNK  Need for Lines, lynn assessed.  Palliative care evaluation.  Restraints not needed.      15 minutes were spent with the patient at the bedside. This care involved high complexity decision making to assess, manipulate, and support vital system functions, to treat this degreee vital organ system failure and to prevent further life threatening deterioration of the patient’s condition  The services I provided to this patient were to treat and/or prevent clinically significant deterioration that could result in the failure of one or more body systems and/or organ systems due to respiratory distress, hypoxia, cardiac dysrhythmia.       HOMAR PATTERSON PA-C  07/21/24  Pulmonary, Critical Care Medicine  [facility]

## 2024-07-23 NOTE — DISCHARGE SUMMARY
COMPARISON: Most recent available comparison head CT is 12/27/2017. TECHNIQUE:  Axial CT images of the head from the skull base to the vertex without IV contrast. CT dose reduction was achieved through use of a standardized protocol tailored for this examination and automatic exposure control for dose modulation. FINDINGS: Ovoid well-defined water density defect in the left midline central hanna is most consistent with a chronic lacunar infarct, new from 2016 head CT. There is a less well-defined low-density lacunar infarct in the lateral aspect of the right thalamus at the thalamocapsular junction. Not present on the previous exam, age indeterminate, acute or subacute most likely. Defect in the left external capsule most consistent with a chronic lacunar infarct. The periventricular white matter lucencies are likely chronic small vessel ischemic changes. No definite findings of a chronic cerebral cortical infarct. No visible mass. There are several linear defects in the right cerebellum that are most likely chronic cerebellar infarcts. Normal brain volumes, no hydrocephalus. No extra-axial fluid collections or acute hemorrhage. No significant sinus disease or mastoid fluid. Bones appear normal.     1. Defect in the right thalamocapsular junction consistent with age indeterminant lacunar infarct, possibly acute. 2. Well-defined defect in the left midline central hanna. Not present on the CT from 2016, but appearance favors chronic lacunar infarct, technically age indeterminant. 3. Chronic small vessel ischemic changes in the cerebral white matter. Small chronic right cerebellar infarcts. Findings called to the ED at 11:10 a.m., 7/20/2024. Electronically signed by Teja Sanford          Procedures:       [] None       [] Other           [] Cardiac procedures performed during your hospitalization:       07/20/24    ECHO (TTE) COMPLETE (PRN CONTRAST/BUBBLE/STRAIN/3D) 07/22/2024  2:19 PM (Final)    Interpretation

## 2025-05-27 PROBLEM — F32.A DEPRESSION: Status: ACTIVE | Noted: 2022-04-25

## 2025-05-27 PROBLEM — K21.9 GASTROESOPHAGEAL REFLUX DISEASE WITHOUT ESOPHAGITIS: Status: ACTIVE | Noted: 2022-04-25

## 2025-05-27 PROBLEM — H25.13 NUCLEAR SCLEROSIS OF BOTH EYES: Status: ACTIVE | Noted: 2025-01-12

## 2025-05-27 PROBLEM — R29.898 RIGHT ARM WEAKNESS: Status: ACTIVE | Noted: 2024-11-16

## 2025-05-27 PROBLEM — I16.1 HYPERTENSIVE EMERGENCY: Status: ACTIVE | Noted: 2024-11-09

## 2025-05-27 PROBLEM — H53.8 BLURRING OF VISUAL IMAGE: Status: ACTIVE | Noted: 2024-11-09

## 2025-05-27 PROBLEM — H49.01 RIGHT OCULOMOTOR NERVE PALSY: Status: ACTIVE | Noted: 2024-11-12

## 2025-05-27 PROBLEM — E78.49 OTHER HYPERLIPIDEMIA: Status: ACTIVE | Noted: 2022-04-25

## 2025-05-27 PROBLEM — G93.40 ACUTE ENCEPHALOPATHY: Status: ACTIVE | Noted: 2022-12-26

## 2025-05-27 PROBLEM — I50.32 CHRONIC HEART FAILURE WITH PRESERVED EJECTION FRACTION (HFPEF) (HCC): Status: ACTIVE | Noted: 2023-08-13

## 2025-05-27 PROBLEM — I25.10 CORONARY ATHEROSCLEROSIS: Status: ACTIVE | Noted: 2022-06-27

## 2025-05-27 PROBLEM — M19.90 ARTHRITIS: Status: ACTIVE | Noted: 2025-05-12

## 2025-05-27 PROBLEM — N17.9 AKI (ACUTE KIDNEY INJURY): Status: ACTIVE | Noted: 2022-12-26

## 2025-05-27 PROBLEM — Z12.11 SCREENING FOR MALIGNANT NEOPLASM OF COLON: Status: ACTIVE | Noted: 2025-05-27

## 2025-05-27 PROBLEM — Z86.73 HISTORY OF CEREBROVASCULAR ACCIDENT: Status: ACTIVE | Noted: 2022-12-26

## 2025-05-27 PROBLEM — I42.9 CARDIOMYOPATHY (HCC): Status: ACTIVE | Noted: 2022-04-25

## 2025-05-27 PROBLEM — H51.22 INO (INTERNUCLEAR OPHTHALMOPLEGIA), LEFT: Status: ACTIVE | Noted: 2025-01-12

## 2025-05-27 PROBLEM — R53.83 FATIGUE: Status: ACTIVE | Noted: 2022-06-17

## 2025-05-27 PROBLEM — R42 VERTIGO: Status: ACTIVE | Noted: 2022-03-27

## 2025-06-26 PROBLEM — Z12.11 SCREENING FOR MALIGNANT NEOPLASM OF COLON: Status: RESOLVED | Noted: 2025-05-27 | Resolved: 2025-06-26

## 2025-06-30 ENCOUNTER — OFFICE VISIT (OUTPATIENT)
Age: 60
End: 2025-06-30
Payer: MEDICAID

## 2025-06-30 VITALS
OXYGEN SATURATION: 97 % | WEIGHT: 190 LBS | HEIGHT: 68 IN | DIASTOLIC BLOOD PRESSURE: 78 MMHG | SYSTOLIC BLOOD PRESSURE: 112 MMHG | BODY MASS INDEX: 28.79 KG/M2 | HEART RATE: 57 BPM | TEMPERATURE: 97.9 F

## 2025-06-30 DIAGNOSIS — R06.02 EXERTIONAL SHORTNESS OF BREATH: Primary | ICD-10-CM

## 2025-06-30 DIAGNOSIS — I63.9 ISCHEMIC STROKE (HCC): ICD-10-CM

## 2025-06-30 DIAGNOSIS — I50.32 CHRONIC HEART FAILURE WITH PRESERVED EJECTION FRACTION (HFPEF) (HCC): ICD-10-CM

## 2025-06-30 DIAGNOSIS — R01.1 SYSTOLIC MURMUR: ICD-10-CM

## 2025-06-30 DIAGNOSIS — R94.31 ABNORMAL ECG: ICD-10-CM

## 2025-06-30 DIAGNOSIS — R53.83 FATIGUE, UNSPECIFIED TYPE: ICD-10-CM

## 2025-06-30 DIAGNOSIS — I10 PRIMARY HYPERTENSION: ICD-10-CM

## 2025-06-30 DIAGNOSIS — E78.00 HYPERCHOLESTEREMIA: ICD-10-CM

## 2025-06-30 PROCEDURE — 99205 OFFICE O/P NEW HI 60 MIN: CPT | Performed by: INTERNAL MEDICINE

## 2025-06-30 PROCEDURE — 3078F DIAST BP <80 MM HG: CPT | Performed by: INTERNAL MEDICINE

## 2025-06-30 PROCEDURE — 93000 ELECTROCARDIOGRAM COMPLETE: CPT | Performed by: INTERNAL MEDICINE

## 2025-06-30 PROCEDURE — 3074F SYST BP LT 130 MM HG: CPT | Performed by: INTERNAL MEDICINE

## 2025-06-30 ASSESSMENT — PATIENT HEALTH QUESTIONNAIRE - PHQ9
2. FEELING DOWN, DEPRESSED OR HOPELESS: NOT AT ALL
1. LITTLE INTEREST OR PLEASURE IN DOING THINGS: NOT AT ALL
SUM OF ALL RESPONSES TO PHQ QUESTIONS 1-9: 0

## 2025-06-30 ASSESSMENT — ENCOUNTER SYMPTOMS
GASTROINTESTINAL NEGATIVE: 1
SHORTNESS OF BREATH: 1
EYES NEGATIVE: 1
ALLERGIC/IMMUNOLOGIC NEGATIVE: 1

## 2025-06-30 NOTE — PROGRESS NOTES
1. \"Have you been to the ER, urgent care clinic since your last visit?  Hospitalized since your last visit?\" Reviewed by Dr. Tariq Gordillo    2. \"Have you seen or consulted any other health care providers outside of the [facility] since your last visit?\" Reviewed by Dr. Tariq Gordillo

## 2025-06-30 NOTE — PROGRESS NOTES
Thanh Hess (:  1965) is a 59 y.o. male,New patient, here for evaluation of the following chief complaint(s):  New Patient ( CAD, HTN, heart failure)      Subjective   SUBJECTIVE/OBJECTIVE:    History of Present Illness  The patient presents for evaluation of stroke, hypertension, and shortness of breath.    He experienced two consecutive strokes approximately two months ago, in 2025, which primarily affected his right leg. The first stroke occurred after he was informed of elevated blood pressure during a doctor's visit, leading to his admission to [facility]. The second stroke happened at home, resulting in his admission to [facility]. He was discharged on a Thursday or Friday but had to return the following Saturday due to a third stroke. Since then, he has been stable. He reports numbness and weakness on his right side and memory issues. Additionally, he mentions an unexplained bruise that appeared one morning. He is currently on clopidogrel and aspirin.    He has a history of hypertension and is currently on lisinopril. He reports not being consistent with his medication regimen prior to the strokes. He used to monitor his blood pressure at home but stopped due to a malfunctioning machine. However, he continues to take his blood pressure medication daily.    He reports experiencing shortness of breath after walking 15 to 20 steps and frequent dizziness.    He has an upcoming ophthalmology appointment on 2025, referred by Dr. Gurrola. His last lab work was conducted last month.    He has no known history of heart failure. A loop recorder was implanted about 15 years ago, which is currently non-functional.    PAST SURGICAL HISTORY:  Loop recorder implantation (15 years ago)    SOCIAL HISTORY  Tobacco: The patient does not smoke.    FAMILY HISTORY  - Negative for strokes in    FAMILY HISTORY  - Negative for heart problems in family history, including heart

## 2025-07-28 ENCOUNTER — OFFICE VISIT (OUTPATIENT)
Age: 60
End: 2025-07-28
Payer: MEDICARE

## 2025-07-28 VITALS
SYSTOLIC BLOOD PRESSURE: 115 MMHG | HEART RATE: 58 BPM | HEIGHT: 68 IN | BODY MASS INDEX: 28.19 KG/M2 | OXYGEN SATURATION: 99 % | WEIGHT: 186 LBS | TEMPERATURE: 97.1 F | DIASTOLIC BLOOD PRESSURE: 66 MMHG

## 2025-07-28 DIAGNOSIS — I35.9 AORTIC VALVE DISEASE: ICD-10-CM

## 2025-07-28 DIAGNOSIS — R06.02 SHORTNESS OF BREATH: Primary | ICD-10-CM

## 2025-07-28 DIAGNOSIS — I10 ESSENTIAL HYPERTENSION: ICD-10-CM

## 2025-07-28 DIAGNOSIS — R42 DIZZINESS: ICD-10-CM

## 2025-07-28 PROCEDURE — 3017F COLORECTAL CA SCREEN DOC REV: CPT

## 2025-07-28 PROCEDURE — G8428 CUR MEDS NOT DOCUMENT: HCPCS

## 2025-07-28 PROCEDURE — 99214 OFFICE O/P EST MOD 30 MIN: CPT

## 2025-07-28 PROCEDURE — 1036F TOBACCO NON-USER: CPT

## 2025-07-28 PROCEDURE — 3074F SYST BP LT 130 MM HG: CPT

## 2025-07-28 PROCEDURE — 3078F DIAST BP <80 MM HG: CPT

## 2025-07-28 PROCEDURE — G8419 CALC BMI OUT NRM PARAM NOF/U: HCPCS

## 2025-07-28 RX ORDER — ATORVASTATIN CALCIUM 80 MG/1
80 TABLET, FILM COATED ORAL NIGHTLY
COMMUNITY
Start: 2025-07-12

## 2025-07-28 ASSESSMENT — ENCOUNTER SYMPTOMS
COUGH: 0
SHORTNESS OF BREATH: 1
DIARRHEA: 0
VOMITING: 0
WHEEZING: 0
RHINORRHEA: 0
SORE THROAT: 0
ABDOMINAL PAIN: 0
NAUSEA: 0

## 2025-07-28 NOTE — PATIENT INSTRUCTIONS
Recommendations:  -Low salt diet - less than 2g daily   -ECHO and carotid US     Patient Education        DASH Diet: Care Instructions  Your Care Instructions     The DASH diet is an eating plan that can help lower your blood pressure. DASH stands for Dietary Approaches to Stop Hypertension. Hypertension is high blood pressure.  The DASH diet focuses on eating foods that are high in calcium, potassium, and magnesium. These nutrients can lower blood pressure. The foods that are highest in these nutrients are fruits, vegetables, low-fat dairy products, nuts, seeds, and legumes. But taking calcium, potassium, and magnesium supplements instead of eating foods that are high in those nutrients does not have the same effect. The DASH diet also includes whole grains, fish, and poultry.  The DASH diet is one of several lifestyle changes your doctor may recommend to lower your high blood pressure. Your doctor may also want you to decrease the amount of sodium in your diet. Lowering sodium while following the DASH diet can lower blood pressure even further than just the DASH diet alone.  Follow-up care is a key part of your treatment and safety. Be sure to make and go to all appointments, and call your doctor if you are having problems. It's also a good idea to know your test results and keep a list of the medicines you take.  How can you care for yourself at home?  Following the DASH diet  Eat 4 to 5 servings of fruit each day. A serving is 1 medium-sized piece of fruit, 1/2 cup raw or canned fruit, 1/4 cup dried fruit, or 4 ounces (1/2 cup) of fruit juice. Choose fruit more often than fruit juice.  Eat 4 to 5 servings of vegetables each day. A serving is 1 cup of lettuce or raw leafy vegetables, 1/2 cup of chopped or cooked vegetables, or 4 ounces (1/2 cup) of vegetable juice. Choose vegetables more often than vegetable juice.  Get 2 to 3 servings of low-fat and fat-free dairy each day. A serving is 8 ounces of milk, 1 cup

## 2025-07-28 NOTE — PROGRESS NOTES
1. \"Have you been to the ER, urgent care clinic since your last visit?  Hospitalized since your last visit?\" Reviewed by TANNA Willard    2. \"Have you seen or consulted any other health care providers outside of the [facility] since your last visit?\" Reviewed by TANNA Willard   
Heart Disease Mother     Hypertension Mother     Cancer Father     No Known Problems Sister     No Known Problems Brother     No Known Problems Maternal Grandmother     No Known Problems Maternal Grandfather     No Known Problems Paternal Grandmother     No Known Problems Paternal Grandfather         Review of Systems   Constitutional:  Negative for activity change, appetite change, chills, diaphoresis, fatigue and fever.   HENT:  Negative for congestion, rhinorrhea and sore throat.    Eyes:  Negative for visual disturbance.   Respiratory:  Positive for shortness of breath. Negative for cough and wheezing.    Cardiovascular:  Negative for chest pain, palpitations and leg swelling.   Gastrointestinal:  Negative for abdominal pain, diarrhea, nausea and vomiting.   Genitourinary:  Negative for flank pain.   Musculoskeletal:  Negative for myalgias.   Skin:  Negative for rash and wound.   Neurological:  Positive for dizziness. Negative for syncope, light-headedness and headaches.   Psychiatric/Behavioral:  Negative for agitation and confusion. The patient is not nervous/anxious.        Physical Exam  HENT:      Head: Normocephalic and atraumatic.      Mouth/Throat:      Mouth: Mucous membranes are moist.   Eyes:      Extraocular Movements: Extraocular movements intact.   Cardiovascular:      Rate and Rhythm: Regular rhythm. Bradycardia present.      Pulses: Normal pulses.      Heart sounds: Murmur heard.      No friction rub. No gallop.   Pulmonary:      Effort: Pulmonary effort is normal. No respiratory distress.      Breath sounds: Normal breath sounds. No stridor. No rales.   Abdominal:      General: There is no distension.      Palpations: Abdomen is soft.      Tenderness: There is no abdominal tenderness. There is no guarding or rebound.   Musculoskeletal:      Right lower leg: No edema.      Left lower leg: No edema.   Skin:     General: Skin is warm and dry.   Neurological:      Mental Status: He is alert and

## (undated) DEVICE — COLUMN DRAPE

## (undated) DEVICE — BLADELESS OBTURATOR: Brand: WECK VISTA

## (undated) DEVICE — KENDALL SCD EXPRESS SLEEVES, KNEE LENGTH, MEDIUM: Brand: KENDALL SCD

## (undated) DEVICE — BLADE ASSEMB CLP HAIR FINE --

## (undated) DEVICE — ARM DRAPE

## (undated) DEVICE — SEAL UNIV 5-8MM DISP BX/10 -- DA VINCI XI - SNGL USE

## (undated) DEVICE — PREP SKN CHLRAPRP 26ML TNT -- CONVERT TO ITEM 373320

## (undated) DEVICE — SUTURE MCRYL SZ 4-0 L18IN ABSRB UD L19MM PS-2 3/8 CIR PRIM Y496G

## (undated) DEVICE — REM POLYHESIVE ADULT PATIENT RETURN ELECTRODE: Brand: VALLEYLAB

## (undated) DEVICE — STERILE POLYISOPRENE POWDER-FREE SURGICAL GLOVES: Brand: PROTEXIS

## (undated) DEVICE — Device

## (undated) DEVICE — SUTURE VCRL SZ 2-0 L27IN ABSRB UD L26MM SH 1/2 CIR J417H

## (undated) DEVICE — ANTI-FOG SOLUTION WITH FOAM PAD: Brand: DEVON

## (undated) DEVICE — DRAPE,UTILITY,TAPE,15X26,STERILE: Brand: MEDLINE

## (undated) DEVICE — NDL PRT INJ NSAF BLNT 18GX1.5 --

## (undated) DEVICE — INTENDED FOR TISSUE SEPARATION, AND OTHER PROCEDURES THAT REQUIRE A SHARP SURGICAL BLADE TO PUNCTURE OR CUT.: Brand: BARD-PARKER ® CARBON RIB-BACK BLADES

## (undated) DEVICE — LIGHT HANDLE: Brand: DEVON

## (undated) DEVICE — DERMABOND SKIN ADH 0.7ML -- DERMABOND ADVANCED 12/BX

## (undated) DEVICE — SOL IRR NACL 0.9% 500ML POUR --

## (undated) DEVICE — SUTURE VLOC 90 2/0 VL 6 GS-22 VLOCM2105